# Patient Record
Sex: FEMALE | Race: BLACK OR AFRICAN AMERICAN | Employment: UNEMPLOYED | RURAL
[De-identification: names, ages, dates, MRNs, and addresses within clinical notes are randomized per-mention and may not be internally consistent; named-entity substitution may affect disease eponyms.]

---

## 2019-09-04 ENCOUNTER — OFFICE VISIT (OUTPATIENT)
Dept: INTERNAL MEDICINE CLINIC | Age: 59
End: 2019-09-04

## 2019-09-04 VITALS
RESPIRATION RATE: 16 BRPM | TEMPERATURE: 98 F | SYSTOLIC BLOOD PRESSURE: 122 MMHG | DIASTOLIC BLOOD PRESSURE: 77 MMHG | WEIGHT: 158 LBS | HEART RATE: 77 BPM | HEIGHT: 63 IN | BODY MASS INDEX: 28 KG/M2 | OXYGEN SATURATION: 97 %

## 2019-09-04 NOTE — PROGRESS NOTES
Chief Complaint   Patient presents with   [facility].     I have reviewed the patient's medical history in detail and updated the computerized patient record. Health Maintenance reviewed. Pt stated he/she does have an Advance Directive    Fall Risk Assessment, last 12 mths 9/4/2019   Able to walk? Yes   Fall in past 12 months? No       3 most recent PHQ Screens 9/4/2019   Little interest or pleasure in doing things Nearly every day   Feeling down, depressed, irritable, or hopeless Nearly every day   Total Score PHQ 2 6       Abuse Screening Questionnaire 9/4/2019   Do you ever feel afraid of your partner? N   Are you in a relationship with someone who physically or mentally threatens you? N   Is it safe for you to go home?  Y       ADL Assessment 9/4/2019   Feeding yourself No Help Needed   Getting from bed to chair No Help Needed   Getting dressed No Help Needed   Bathing or showering No Help Needed   Walk across the room (includes cane/walker) No Help Needed   Using the telphone No Help Needed   Taking your medications No Help Needed   Preparing meals No Help Needed   Managing money (expenses/bills) No Help Needed   Moderately strenuous housework (laundry) No Help Needed   Shopping for personal items (toiletries/medicines) No Help Needed   Shopping for groceries No Help Needed   Driving No Help Needed   Climbing a flight of stairs No Help Needed   Getting to places beyond walking distances No Help Needed       Pt was unable to wait and left with her ride    Dr. Brian Walter did not see this patient

## 2021-11-09 ENCOUNTER — OFFICE VISIT (OUTPATIENT)
Dept: FAMILY MEDICINE CLINIC | Age: 61
End: 2021-11-09
Payer: MEDICAID

## 2021-11-09 VITALS
OXYGEN SATURATION: 95 % | BODY MASS INDEX: 26.82 KG/M2 | HEIGHT: 63 IN | RESPIRATION RATE: 18 BRPM | SYSTOLIC BLOOD PRESSURE: 131 MMHG | HEART RATE: 86 BPM | TEMPERATURE: 97.9 F | DIASTOLIC BLOOD PRESSURE: 84 MMHG | WEIGHT: 151.4 LBS

## 2021-11-09 DIAGNOSIS — I25.10 CORONARY ARTERY DISEASE INVOLVING NATIVE CORONARY ARTERY OF NATIVE HEART WITHOUT ANGINA PECTORIS: ICD-10-CM

## 2021-11-09 DIAGNOSIS — I21.02 ST ELEVATION MYOCARDIAL INFARCTION INVOLVING LEFT ANTERIOR DESCENDING (LAD) CORONARY ARTERY (HCC): Primary | ICD-10-CM

## 2021-11-09 DIAGNOSIS — I50.20 HFREF (HEART FAILURE WITH REDUCED EJECTION FRACTION) (HCC): ICD-10-CM

## 2021-11-09 DIAGNOSIS — D64.9 ANEMIA, UNSPECIFIED TYPE: ICD-10-CM

## 2021-11-09 DIAGNOSIS — I23.6 LV (LEFT VENTRICULAR) MURAL THROMBUS FOLLOWING MI (HCC): ICD-10-CM

## 2021-11-09 DIAGNOSIS — I10 PRIMARY HYPERTENSION: ICD-10-CM

## 2021-11-09 LAB
ALBUMIN SERPL-MCNC: 3.3 G/DL (ref 3.5–5)
ALBUMIN/GLOB SERPL: 0.9 {RATIO} (ref 1.1–2.2)
ALP SERPL-CCNC: 76 U/L (ref 45–117)
ALT SERPL-CCNC: 24 U/L (ref 12–78)
ANION GAP SERPL CALC-SCNC: 8 MMOL/L (ref 5–15)
AST SERPL-CCNC: 18 U/L (ref 15–37)
BASOPHILS # BLD: 0 K/UL (ref 0–0.1)
BASOPHILS NFR BLD: 1 % (ref 0–1)
BILIRUB SERPL-MCNC: 0.3 MG/DL (ref 0.2–1)
BUN SERPL-MCNC: 8 MG/DL (ref 6–20)
BUN/CREAT SERPL: 10 (ref 12–20)
CALCIUM SERPL-MCNC: 9.4 MG/DL (ref 8.5–10.1)
CHLORIDE SERPL-SCNC: 108 MMOL/L (ref 97–108)
CO2 SERPL-SCNC: 21 MMOL/L (ref 21–32)
CREAT SERPL-MCNC: 0.83 MG/DL (ref 0.55–1.02)
DIFFERENTIAL METHOD BLD: ABNORMAL
EOSINOPHIL # BLD: 0 K/UL (ref 0–0.4)
EOSINOPHIL NFR BLD: 1 % (ref 0–7)
ERYTHROCYTE [DISTWIDTH] IN BLOOD BY AUTOMATED COUNT: 15.3 % (ref 11.5–14.5)
GLOBULIN SER CALC-MCNC: 3.8 G/DL (ref 2–4)
GLUCOSE SERPL-MCNC: 61 MG/DL (ref 65–100)
HCT VFR BLD AUTO: 40.3 % (ref 35–47)
HGB BLD-MCNC: 13.3 G/DL (ref 11.5–16)
IMM GRANULOCYTES # BLD AUTO: 0 K/UL (ref 0–0.04)
IMM GRANULOCYTES NFR BLD AUTO: 0 % (ref 0–0.5)
LYMPHOCYTES # BLD: 1.5 K/UL (ref 0.8–3.5)
LYMPHOCYTES NFR BLD: 37 % (ref 12–49)
MCH RBC QN AUTO: 31.6 PG (ref 26–34)
MCHC RBC AUTO-ENTMCNC: 33 G/DL (ref 30–36.5)
MCV RBC AUTO: 95.7 FL (ref 80–99)
MONOCYTES # BLD: 0.6 K/UL (ref 0–1)
MONOCYTES NFR BLD: 16 % (ref 5–13)
NEUTS SEG # BLD: 1.8 K/UL (ref 1.8–8)
NEUTS SEG NFR BLD: 45 % (ref 32–75)
NRBC # BLD: 0 K/UL (ref 0–0.01)
NRBC BLD-RTO: 0 PER 100 WBC
PLATELET # BLD AUTO: 230 K/UL (ref 150–400)
PMV BLD AUTO: 10.6 FL (ref 8.9–12.9)
POTASSIUM SERPL-SCNC: 4.7 MMOL/L (ref 3.5–5.1)
PROT SERPL-MCNC: 7.1 G/DL (ref 6.4–8.2)
RBC # BLD AUTO: 4.21 M/UL (ref 3.8–5.2)
SODIUM SERPL-SCNC: 137 MMOL/L (ref 136–145)
WBC # BLD AUTO: 4 K/UL (ref 3.6–11)

## 2021-11-09 PROCEDURE — 99204 OFFICE O/P NEW MOD 45 MIN: CPT | Performed by: FAMILY MEDICINE

## 2021-11-09 RX ORDER — NITROGLYCERIN 0.4 MG/1
TABLET SUBLINGUAL
COMMUNITY
Start: 2021-10-18

## 2021-11-09 RX ORDER — WARFARIN 10 MG/1
10 TABLET ORAL DAILY
Qty: 30 TABLET | Refills: 3 | Status: SHIPPED | OUTPATIENT
Start: 2021-11-09 | End: 2021-12-07 | Stop reason: SDUPTHER

## 2021-11-09 RX ORDER — PANTOPRAZOLE SODIUM 40 MG/1
40 TABLET, DELAYED RELEASE ORAL
Qty: 30 TABLET | Refills: 3 | Status: SHIPPED | OUTPATIENT
Start: 2021-11-09 | End: 2022-02-09 | Stop reason: SDUPTHER

## 2021-11-09 RX ORDER — CLOPIDOGREL BISULFATE 75 MG/1
75 TABLET ORAL DAILY
Qty: 30 TABLET | Refills: 3 | Status: SHIPPED | OUTPATIENT
Start: 2021-11-09 | End: 2022-02-09 | Stop reason: SDUPTHER

## 2021-11-09 RX ORDER — ATORVASTATIN CALCIUM 80 MG/1
80 TABLET, FILM COATED ORAL DAILY
Qty: 30 TABLET | Refills: 3 | Status: SHIPPED | OUTPATIENT
Start: 2021-11-09 | End: 2022-02-09 | Stop reason: SDUPTHER

## 2021-11-09 RX ORDER — SPIRONOLACTONE 25 MG/1
25 TABLET ORAL DAILY
Qty: 30 TABLET | Refills: 3 | Status: SHIPPED | OUTPATIENT
Start: 2021-11-09 | End: 2022-02-09 | Stop reason: SDUPTHER

## 2021-11-09 RX ORDER — SACUBITRIL AND VALSARTAN 49; 51 MG/1; MG/1
1 TABLET, FILM COATED ORAL 2 TIMES DAILY
Qty: 30 TABLET | Refills: 3 | Status: SHIPPED | OUTPATIENT
Start: 2021-11-09 | End: 2022-01-14

## 2021-11-09 RX ORDER — SPIRONOLACTONE 25 MG/1
25 TABLET ORAL DAILY
COMMUNITY
Start: 2021-10-16 | End: 2021-11-09 | Stop reason: SDUPTHER

## 2021-11-09 RX ORDER — ATORVASTATIN CALCIUM 80 MG/1
80 TABLET, FILM COATED ORAL DAILY
COMMUNITY
Start: 2021-10-16 | End: 2021-11-09 | Stop reason: SDUPTHER

## 2021-11-09 RX ORDER — CLOPIDOGREL BISULFATE 75 MG/1
75 TABLET ORAL DAILY
COMMUNITY
Start: 2021-10-16 | End: 2021-11-09 | Stop reason: SDUPTHER

## 2021-11-09 RX ORDER — PANTOPRAZOLE SODIUM 40 MG/1
40 TABLET, DELAYED RELEASE ORAL
COMMUNITY
Start: 2021-10-16 | End: 2021-11-09 | Stop reason: SDUPTHER

## 2021-11-09 RX ORDER — WARFARIN 10 MG/1
10 TABLET ORAL DAILY
COMMUNITY
Start: 2021-10-16 | End: 2021-11-09 | Stop reason: SDUPTHER

## 2021-11-09 RX ORDER — SACUBITRIL AND VALSARTAN 49; 51 MG/1; MG/1
1 TABLET, FILM COATED ORAL 2 TIMES DAILY
COMMUNITY
Start: 2021-10-19 | End: 2021-11-09 | Stop reason: SDUPTHER

## 2021-11-09 RX ORDER — LANOLIN ALCOHOL/MO/W.PET/CERES
3 CREAM (GRAM) TOPICAL
COMMUNITY
Start: 2021-10-16 | End: 2022-05-31 | Stop reason: SDUPTHER

## 2021-11-09 NOTE — PROGRESS NOTES
Health Maintenance Due   Topic Date Due    Hepatitis C Screening  Never done    Pneumococcal 0-64 years (1 of 2 - PPSV23) Never done    COVID-19 Vaccine (1) Never done    DTaP/Tdap/Td series (1 - Tdap) Never done    Cervical cancer screen  Never done    Lipid Screen  Never done    Colorectal Cancer Screening Combo  Never done    Shingrix Vaccine Age 50> (1 of 2) Never done    Breast Cancer Screen Mammogram  Never done    Flu Vaccine (1) Never done

## 2021-11-09 NOTE — PROGRESS NOTES
ALEJANDRA Luo is a 59 y.o. female who Presents to [facility] care. Evidently recently hospitalized at [address] [place] [address]. Pulled [facility] record prior to visit. Admitted 10/610/19 for STEMI and LV thrombus. Presented via EMS for intermittent chest pain worsening on exertion and becoming acutely more severe prompting her to call 911. EKG showed an anterior STEMI. Cath ZEN to mid LAD which improved her chest pain. 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 Echo showed 3 x 2 cm thrombus. Needs warfarin for this. Was given heparin bridge to warfarin    Will need repeat TTE in 3 months to assess LV thrombus    Acute systolic heart failure LVEF 30-35%. Also recommended repeat TTE in 3 months to consider primary prevention ICD. She had bradycardia so no beta-blocker was started    Some incidental abnormal labs. Hyponatremia thought to be due to dehydration.  on admission. Anemia hemoglobin 11.7 normal iron levels. Consider anemia of chronic disease. Mention 34 beers per day. No issues of withdrawal and patient. Recently suffered the loss of her . PMHx:  Past Medical History:   Diagnosis Date    Hypertension     Psychiatric disorder     panic, anxiety       Meds:   Current Outpatient Medications   Medication Sig Dispense Refill    Entresto 49-51 mg tab tablet Take 1 Tablet by mouth two (2) times a day.  warfarin (COUMADIN) 10 mg tablet Take 10 mg by mouth daily.  clopidogreL (PLAVIX) 75 mg tab Take 75 mg by mouth daily.  atorvastatin (LIPITOR) 80 mg tablet Take 80 mg by mouth daily.  pantoprazole (PROTONIX) 40 mg tablet Take 40 mg by mouth Daily (before breakfast).  spironolactone (ALDACTONE) 25 mg tablet Take 25 mg by mouth daily.  nitroglycerin (NITROSTAT) 0.4 mg SL tablet DISSOLVE 1 TABLET UNDER THE TONGUE AS NEEDED FOR CHEST PAIN, MAY REPEAT EVERY 5 MINUTES FOR 3 TOTAL DOSES. IF PAIN IS NOT RELIEVED AFTER FIRS      melatonin 3 mg tablet Take 3 mg by mouth nightly as needed.  potassium chloride SR (KLOR-CON 10) 10 mEq tablet Take 1 Tab by mouth daily. (Patient not taking: Reported on 11/9/2021) 30 Tab 0    furosemide (LASIX) 20 mg tablet Take 1 Tab by mouth daily. (Patient not taking: Reported on 11/9/2021) 15 Tab 0    diazepam (VALIUM) 5 mg tablet Take 1 Tab by mouth every twelve (12) hours as needed for Anxiety. (Patient not taking: Reported on 11/9/2021) 30 Tab 0       Allergies: Allergies   Allergen Reactions    Iodine Other (comments)       Smoker:  Social History     Tobacco Use   Smoking Status Current Every Day Smoker    Packs/day: 0.50    Types: Cigarettes   Smokeless Tobacco Never Used       ETOH:   Social History     Substance and Sexual Activity   Alcohol Use No       FH:   Family History   Problem Relation Age of Onset    Heart Disease Mother     Cancer Father        ROS:   As listed in HPI. In addition:  Constitutional:   No headache, fever, fatigue, weight loss or weight gain      Cardiac:    No chest pain      Resp:   No cough or shortness of breath      Neuro   No loss of consciousness, dizziness, seizures      Physical Exam:  Blood pressure 131/84, pulse 86, temperature 97.9 °F (36.6 °C), temperature source Temporal, resp. rate 18, height 5' 3\" (1.6 m), weight 151 lb 6.4 oz (68.7 kg), SpO2 95 %. GEN: No apparent distress. Alert and oriented and responds to all questions appropriately. NEUROLOGIC:  No focal neurologic deficits. Strength and sensation grossly intact. Coordination and gait grossly intact. EXT: Well perfused. No edema. SKIN: No obvious rashes. 4 cm inclusion cyst left neck. Not painful the patient.   Reports this has been present since she was a teenager  Lungs clear to auscultation bilaterally  CV regular rhythm no murmur         Assessment and Plan     STEMI  Plavix  Atorvastatin 80  Entresto 4951  No beta-blocker due to bradycardia in the hospital.  No longer bradycardic but will defer this to cardiology    LV thrombus  On Coumadin. Currently getting Coumadin checks through her home health. Reports that home health is notifying her cardiologist for Coumadin dosing  She does not know what she is taking but reports that her INR was 2.0    Plan will be to check TTE in 3 months and decide whether she needs to continue Coumadin    At some point home health will stop coming and she will need to go to a Coumadin clinic. I think should find a most convenient to go to [address] [place] [address] cardiology if they do a Coumadin clinic. If not she is welcome to come here but I emphasized to her the importance of going somewhere for Coumadin checks    We will check CBC and CMP which were abnormal when she went to the hospital but pretty much normalized by discharge    Hospital discharge  10.9 hemoglobin, felt to be anemia of chronic disease  GFR 86  Sodium 138    This was a 45-minute visit. Greater than 50% of the time was spent counseling the patient on recent heart attack. She had many questions. ICD-10-CM ICD-9-CM    1. ST elevation myocardial infarction involving left anterior descending (LAD) coronary artery ([facility])  I21.02 410.10    2. Anemia, unspecified type  D64.9 285.9    3. Primary hypertension  I10 401.9    4. HFrEF (heart failure with reduced ejection fraction) ([facility])  I50.20 428.20 CBC WITH AUTOMATED DIFF      METABOLIC PANEL, COMPREHENSIVE   5. Coronary artery disease involving native coronary artery of native heart without angina pectoris  I25.10 414.01    6. LV (left ventricular) mural thrombus following MI ([facility])  I23.6 429.79      410.82        AVS given.  Pt expressed understanding of instructions no

## 2021-12-07 NOTE — TELEPHONE ENCOUNTER
ALANA verified. Pt believes that her warfarin pill is not the right pill. She describes it as a round, white pill. When she split the pill with her knife, the medicine has \"messed her up from head to feet\". She cannot explain it; so she wants Dr. Denise Frazier to call her.

## 2021-12-08 RX ORDER — WARFARIN 10 MG/1
10 TABLET ORAL DAILY
Qty: 30 TABLET | Refills: 3 | Status: SHIPPED | OUTPATIENT
Start: 2021-12-08 | End: 2022-02-09 | Stop reason: SDUPTHER

## 2021-12-08 NOTE — TELEPHONE ENCOUNTER
Returned call. Really does not like the round pill. Explained that it is unlikely she was having these side effects from the medicine but she feels that bad vibes are coming from this particular pill. She is not willing to take the Coumadin that she has. Called in a new prescription to [facility] see if there is something different    She is not sure how much she is taking, its been written down by her home health nurse. She had her last visit with home health last week and reports that her INR was 1.6. Plans to come here to follow-up on Coumadin but has not made an appointment yet. I encouraged her to make an appointment for Coumadin check next week.   Emphasize she needs to bring her dosing schedule

## 2021-12-17 ENCOUNTER — OFFICE VISIT (OUTPATIENT)
Dept: FAMILY MEDICINE CLINIC | Age: 61
End: 2021-12-17
Payer: MEDICAID

## 2021-12-17 VITALS
TEMPERATURE: 98.5 F | BODY MASS INDEX: 27.21 KG/M2 | HEIGHT: 63 IN | RESPIRATION RATE: 16 BRPM | SYSTOLIC BLOOD PRESSURE: 113 MMHG | DIASTOLIC BLOOD PRESSURE: 77 MMHG | OXYGEN SATURATION: 96 % | HEART RATE: 81 BPM | WEIGHT: 153.6 LBS

## 2021-12-17 DIAGNOSIS — I10 PRIMARY HYPERTENSION: ICD-10-CM

## 2021-12-17 DIAGNOSIS — D64.9 ANEMIA, UNSPECIFIED TYPE: ICD-10-CM

## 2021-12-17 DIAGNOSIS — I23.6 LV (LEFT VENTRICULAR) MURAL THROMBUS FOLLOWING MI (HCC): Primary | ICD-10-CM

## 2021-12-17 LAB
INR BLD: 1
PT POC: 12.3 SECONDS
VALID INTERNAL CONTROL?: YES

## 2021-12-17 PROCEDURE — 99214 OFFICE O/P EST MOD 30 MIN: CPT | Performed by: FAMILY MEDICINE

## 2021-12-17 PROCEDURE — 85610 PROTHROMBIN TIME: CPT | Performed by: FAMILY MEDICINE

## 2021-12-17 NOTE — PROGRESS NOTES
1. Have you been to the ER, urgent care clinic since your last visit? Hospitalized since your last visit? No    2. Have you seen or consulted any other health care providers outside of the [address] [place] [address] since your last visit? Include any pap smears or colon screening.  No    Health Maintenance Due   Topic Date Due    Hepatitis C Screening  Never done    COVID-19 Vaccine (1) Never done    Pneumococcal 0-64 years (1 of 2 - PPSV23) Never done    DTaP/Tdap/Td series (1 - Tdap) Never done    Cervical cancer screen  Never done    Colorectal Cancer Screening Combo  Never done    Shingrix Vaccine Age 50> (1 of 2) Never done    Breast Cancer Screen Mammogram  Never done    Flu Vaccine (1) Never done     Chief Complaint   Patient presents with    Coagulation disorder     f/u   [facility]

## 2021-12-17 NOTE — PROGRESS NOTES
HPI  Fay Greco is a 59 y.o. female who presents to follow-up on LV thrombus. Establish care November 2021. At that point recently hospitalized at [facility]. Is just negative is not  Admitted 10/610/19 for STEMI and LV thrombus. Presented via EMS for intermittent chest pain worsening on exertion and becoming acutely more severe prompting her to call 911. EKG showed an anterior STEMI. Cath ZEN to mid LAD which improved her chest pain. 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 Echo showed 3 x 2 cm thrombus. Needs warfarin for this. Was given heparin bridge to warfarin    Will need repeat TTE in 3 months to assess LV thrombus    Acute systolic heart failure LVEF 30-35%. Also recommended repeat TTE in 3 months to consider primary prevention ICD. She had bradycardia so no beta-blocker was started    Some incidental abnormal labs. Hyponatremia thought to be due to dehydration.  on admission. Anemia hemoglobin 11.7 normal iron levels. Consider anemia of chronic disease. Mention 34 beers per day. No issues of withdrawal and patient. Recently suffered the loss of her . PMHx:  Past Medical History:   Diagnosis Date    Hypertension     Psychiatric disorder     panic, anxiety       Meds:   Current Outpatient Medications   Medication Sig Dispense Refill    warfarin (COUMADIN) 10 mg tablet Take 1 Tablet by mouth daily. 30 Tablet 3    melatonin 3 mg tablet Take 3 mg by mouth nightly as needed.  sacubitriL-valsartan (Entresto) 49-51 mg tab tablet Take 1 Tablet by mouth two (2) times a day. 30 Tablet 3    clopidogreL (PLAVIX) 75 mg tab Take 1 Tablet by mouth daily. 30 Tablet 3    atorvastatin (LIPITOR) 80 mg tablet Take 1 Tablet by mouth daily. 30 Tablet 3    pantoprazole (PROTONIX) 40 mg tablet Take 1 Tablet by mouth Daily (before breakfast). 30 Tablet 3    spironolactone (ALDACTONE) 25 mg tablet Take 1 Tablet by mouth daily.  27 Tablet 3    nitroglycerin (NITROSTAT) 0.4 mg SL tablet DISSOLVE 1 TABLET UNDER THE TONGUE AS NEEDED FOR CHEST PAIN, MAY REPEAT EVERY 5 MINUTES FOR 3 TOTAL DOSES. IF PAIN IS NOT RELIEVED AFTER FIRS (Patient not taking: Reported on 12/17/2021)         Allergies: Allergies   Allergen Reactions    Iodine Other (comments)       Smoker:  Social History     Tobacco Use   Smoking Status Current Every Day Smoker    Packs/day: 0.50    Types: Cigarettes   Smokeless Tobacco Never Used       ETOH:   Social History     Substance and Sexual Activity   Alcohol Use No       FH:   Family History   Problem Relation Age of Onset    Heart Disease Mother     Cancer Father        ROS:   As listed in HPI. In addition:  Constitutional:   No headache, fever, fatigue, weight loss or weight gain      Cardiac:    No chest pain      Resp:   No cough or shortness of breath      Neuro   No loss of consciousness, dizziness, seizures      Physical Exam:  Blood pressure 113/77, pulse 81, temperature 98.5 °F (36.9 °C), temperature source Oral, resp. rate 16, height 5' 3\" (1.6 m), weight 153 lb 9.6 oz (69.7 kg), SpO2 96 %. GEN: No apparent distress. Alert and oriented and responds to all questions appropriately. NEUROLOGIC:  No focal neurologic deficits. Strength and sensation grossly intact. Coordination and gait grossly intact. EXT: Well perfused. No edema. SKIN: No obvious rashes. 4 cm inclusion cyst left neck. Not painful the patient. Reports this has been present since she was a teenager  Lungs clear to auscultation bilaterally  CV regular rhythm no murmur         Assessment and Plan     STEMI  Plavix  Atorvastatin 80  Entresto 4951  No beta-blocker due to bradycardia in the hospital.  No longer bradycardic but will defer this to cardiology    LV thrombus  On Coumadin. Had been getting Coumadin checks through home health.   No longer receiving home health  She will transition to getting Coumadin checks with us  She has not currently taking Coumadin because she was suspicious that the pill she received from pharmacy. No Coumadin for a week. INR today 1.0.. Has a medication that she is comfortable with now    She thinks she is taking 10 mg 3 times a week and 5 mg the rest of the days. She is not sure about this and has her dosing instructions written down at home    Return in 1 week for Coumadin check  Bring your dosing instructions we know what you are taking      Plan will be to check TTE in 3 months and decide whether she needs to continue Coumadin        ICD-10-CM ICD-9-CM    1. LV (left ventricular) mural thrombus following MI ([facility])  I23.6 429.79 AMB POC PT/INR     410.82        AVS given.  Pt expressed understanding of instructions

## 2021-12-29 ENCOUNTER — CLINICAL SUPPORT (OUTPATIENT)
Dept: FAMILY MEDICINE CLINIC | Age: 61
End: 2021-12-29
Payer: MEDICAID

## 2021-12-29 DIAGNOSIS — I23.6 LV (LEFT VENTRICULAR) MURAL THROMBUS FOLLOWING MI (HCC): Primary | ICD-10-CM

## 2021-12-29 LAB
INR BLD: 1.7
PT POC: 20.3 SECONDS
VALID INTERNAL CONTROL?: YES

## 2021-12-29 PROCEDURE — 85610 PROTHROMBIN TIME: CPT | Performed by: FAMILY MEDICINE

## 2022-01-12 ENCOUNTER — CLINICAL SUPPORT (OUTPATIENT)
Dept: FAMILY MEDICINE CLINIC | Age: 62
End: 2022-01-12
Payer: MEDICAID

## 2022-01-12 DIAGNOSIS — I23.6 LV (LEFT VENTRICULAR) MURAL THROMBUS FOLLOWING MI (HCC): Primary | ICD-10-CM

## 2022-01-12 LAB
INR BLD: 1.9
PT POC: 22.3 SECONDS
VALID INTERNAL CONTROL?: YES

## 2022-01-12 PROCEDURE — 85610 PROTHROMBIN TIME: CPT | Performed by: FAMILY MEDICINE

## 2022-01-12 NOTE — PROGRESS NOTES
Aidan Tian is a 59 y.o. female who presents today for Anticoagulation monitoring. Indication: Thrombosis  INR Goal: 2.0-3.0. Current dose:  Coumadin 10 mg daily, except 5 mg Tues/Thurs. Missed Coumadin Doses:  None  Medication Changes:  no  Dietary Changes:  no    Symptoms: taking coumadin appropriately without any bleeding. Latest INRs:  Lab Results   Component Value Date/Time    INR POC 1.7 12/29/2021 02:31 PM    INR POC 1.0 12/17/2021 01:45 PM        New Coumadin dose:.current treatment plan is effective, no change in therapy, the following changes are made - see anti-coag calendar. Next check to be scheduled for  2 weeks.

## 2022-01-26 ENCOUNTER — CLINICAL SUPPORT (OUTPATIENT)
Dept: FAMILY MEDICINE CLINIC | Age: 62
End: 2022-01-26
Payer: MEDICAID

## 2022-01-26 DIAGNOSIS — I23.6 LV (LEFT VENTRICULAR) MURAL THROMBUS FOLLOWING MI (HCC): Primary | ICD-10-CM

## 2022-01-26 LAB
INR BLD: 2.1
PT POC: 24.9 SECONDS
VALID INTERNAL CONTROL?: YES

## 2022-01-26 PROCEDURE — 85610 PROTHROMBIN TIME: CPT | Performed by: FAMILY MEDICINE

## 2022-01-26 NOTE — PROGRESS NOTES
Arabella Zuluaga is a 57 y.o. female who presents today for Anticoagulation monitoring. Indication: Thrombus  INR Goal: 2.0-3.0. Current dose:  Coumadin 10 mg daily, except 5 mg Thurs. Missed Coumadin Doses:  None  Medication Changes:  no  Dietary Changes:  no    Symptoms: taking coumadin appropriately without any bleeding. Latest INRs:  Lab Results   Component Value Date/Time    INR POC 2.1 01/26/2022 02:03 PM    INR POC 1.9 01/12/2022 02:12 PM    INR POC 1.7 12/29/2021 02:31 PM        New Coumadin dose:.current treatment plan is effective, no change in therapy. Next check to be scheduled for  2 weeks.

## 2022-02-09 ENCOUNTER — CLINICAL SUPPORT (OUTPATIENT)
Dept: FAMILY MEDICINE CLINIC | Age: 62
End: 2022-02-09
Payer: MEDICAID

## 2022-02-09 DIAGNOSIS — I23.6 LV (LEFT VENTRICULAR) MURAL THROMBUS FOLLOWING MI (HCC): Primary | ICD-10-CM

## 2022-02-09 LAB
INR BLD: 1.7
PT POC: 20.9 SECONDS
VALID INTERNAL CONTROL?: YES

## 2022-02-09 PROCEDURE — 85610 PROTHROMBIN TIME: CPT | Performed by: FAMILY MEDICINE

## 2022-02-09 RX ORDER — SACUBITRIL AND VALSARTAN 49; 51 MG/1; MG/1
1 TABLET, FILM COATED ORAL 2 TIMES DAILY
Qty: 60 TABLET | Refills: 3 | Status: SHIPPED | OUTPATIENT
Start: 2022-02-09 | End: 2022-05-31 | Stop reason: SDUPTHER

## 2022-02-09 RX ORDER — ATORVASTATIN CALCIUM 80 MG/1
80 TABLET, FILM COATED ORAL DAILY
Qty: 30 TABLET | Refills: 3 | Status: SHIPPED | OUTPATIENT
Start: 2022-02-09 | End: 2022-05-31 | Stop reason: SDUPTHER

## 2022-02-09 RX ORDER — PANTOPRAZOLE SODIUM 40 MG/1
40 TABLET, DELAYED RELEASE ORAL
Qty: 30 TABLET | Refills: 3 | Status: SHIPPED | OUTPATIENT
Start: 2022-02-09 | End: 2022-05-31 | Stop reason: SDUPTHER

## 2022-02-09 RX ORDER — CLOPIDOGREL BISULFATE 75 MG/1
75 TABLET ORAL DAILY
Qty: 30 TABLET | Refills: 3 | Status: SHIPPED | OUTPATIENT
Start: 2022-02-09 | End: 2022-05-31 | Stop reason: SDUPTHER

## 2022-02-09 RX ORDER — SPIRONOLACTONE 25 MG/1
25 TABLET ORAL DAILY
Qty: 30 TABLET | Refills: 3 | Status: SHIPPED | OUTPATIENT
Start: 2022-02-09 | End: 2022-05-31 | Stop reason: SDUPTHER

## 2022-02-09 RX ORDER — WARFARIN 10 MG/1
10 TABLET ORAL DAILY
Qty: 30 TABLET | Refills: 3 | Status: SHIPPED | OUTPATIENT
Start: 2022-02-09 | End: 2022-05-31 | Stop reason: SDUPTHER

## 2022-02-09 NOTE — TELEPHONE ENCOUNTER
Requested Prescriptions     Pending Prescriptions Disp Refills    warfarin (COUMADIN) 10 mg tablet 30 Tablet 3     Sig: Take 1 Tablet by mouth daily.

## 2022-02-09 NOTE — TELEPHONE ENCOUNTER
Requested Prescriptions     Pending Prescriptions Disp Refills    atorvastatin (LIPITOR) 80 mg tablet 30 Tablet 3     Sig: Take 1 Tablet by mouth daily.  clopidogreL (PLAVIX) 75 mg tab 30 Tablet 3     Sig: Take 1 Tablet by mouth daily.  sacubitriL-valsartan (Entresto) 49-51 mg tab tablet 90 Tablet 3     Sig: Take 1 Tablet by mouth two (2) times a day.  pantoprazole (PROTONIX) 40 mg tablet 30 Tablet 3     Sig: Take 1 Tablet by mouth Daily (before breakfast).  spironolactone (ALDACTONE) 25 mg tablet 30 Tablet 3     Sig: Take 1 Tablet by mouth daily.

## 2022-02-09 NOTE — PROGRESS NOTES
Annia Romero is a 57 y.o. female who presents today for Anticoagulation monitoring. Indication: LV Mural Thrombus  INR Goal: 2.0-3.0. Current dose:  Coumadin 10 mg daily, except 5 mg Thurs. Missed Coumadin Doses:  None  Medication Changes:  no  Dietary Changes:  no    Symptoms: taking coumadin appropriately with a small amount of bleeding. Latest INRs:  Lab Results   Component Value Date/Time    INR POC 2.1 01/26/2022 02:03 PM    INR POC 1.9 01/12/2022 02:12 PM    INR POC 1.7 12/29/2021 02:31 PM        New Coumadin dose:.current treatment plan is effective, no change in therapy. Next check to be scheduled for  2 weeks.

## 2022-02-23 ENCOUNTER — CLINICAL SUPPORT (OUTPATIENT)
Dept: FAMILY MEDICINE CLINIC | Age: 62
End: 2022-02-23
Payer: MEDICAID

## 2022-02-23 DIAGNOSIS — I23.6 LV (LEFT VENTRICULAR) MURAL THROMBUS FOLLOWING MI (HCC): Primary | ICD-10-CM

## 2022-02-23 LAB
INR BLD: 2.8
PT POC: 33.4 SECONDS
VALID INTERNAL CONTROL?: YES

## 2022-02-23 PROCEDURE — 85610 PROTHROMBIN TIME: CPT | Performed by: FAMILY MEDICINE

## 2022-02-23 NOTE — PROGRESS NOTES
Scarlett Graves is a 57 y.o. female who presents today for Anticoagulation monitoring. Indication: Thrombosus  INR Goal: 2.0-3.0. Current dose:  Coumadin 10 mg daily, except 5 mg Thurs. Missed Coumadin Doses:  None  Medication Changes:  no  Dietary Changes:  no    Symptoms: taking coumadin appropriately without any bleeding. Latest INRs:  Lab Results   Component Value Date/Time    INR POC 2.8 02/23/2022 02:03 PM    INR POC 1.7 02/09/2022 02:01 PM    INR POC 2.1 01/26/2022 02:03 PM        New Coumadin dose:.current treatment plan is effective, no change in therapy. Next check to be scheduled for  2 weeks.

## 2022-03-09 ENCOUNTER — CLINICAL SUPPORT (OUTPATIENT)
Dept: FAMILY MEDICINE CLINIC | Age: 62
End: 2022-03-09
Payer: MEDICAID

## 2022-03-09 DIAGNOSIS — I23.6 LV (LEFT VENTRICULAR) MURAL THROMBUS FOLLOWING MI (HCC): Primary | ICD-10-CM

## 2022-03-09 LAB
INR BLD: 2.2
PT POC: 27 SECONDS
VALID INTERNAL CONTROL?: YES

## 2022-03-09 PROCEDURE — 85610 PROTHROMBIN TIME: CPT | Performed by: FAMILY MEDICINE

## 2022-03-09 NOTE — PROGRESS NOTES
Nimco Fields is a 57 y.o. female who presents today for Anticoagulation monitoring. Indication: Thrombus  INR Goal: 2.0-3.0. Current dose:  Coumadin 10 mg daily, except 5 mg Thurs. Missed Coumadin Doses:  None  Medication Changes:  no  Dietary Changes:  no    Symptoms: taking coumadin appropriately without any bleeding. Latest INRs:  Lab Results   Component Value Date/Time    INR POC 2.8 02/23/2022 02:03 PM    INR POC 1.7 02/09/2022 02:01 PM    INR POC 2.1 01/26/2022 02:03 PM        New Coumadin dose:.current treatment plan is effective, no change in therapy. Next check to be scheduled for  2 weeks.

## 2022-04-13 ENCOUNTER — CLINICAL SUPPORT (OUTPATIENT)
Dept: FAMILY MEDICINE CLINIC | Age: 62
End: 2022-04-13
Payer: MEDICAID

## 2022-04-13 DIAGNOSIS — I23.6 LV (LEFT VENTRICULAR) MURAL THROMBUS FOLLOWING MI (HCC): Primary | ICD-10-CM

## 2022-04-13 LAB
INR BLD: 1.7
PT POC: 20.4 SECONDS
VALID INTERNAL CONTROL?: YES

## 2022-04-13 PROCEDURE — 85610 PROTHROMBIN TIME: CPT | Performed by: FAMILY MEDICINE

## 2022-04-13 NOTE — PROGRESS NOTES
Steff Jackson is a 57 y.o. female who presents today for Anticoagulation monitoring. Indication: Thrombus  INR Goal: 2.0-3.0. Current dose:  Coumadin 10 mg daily, except 5 mg Thurs. Missed Coumadin Doses:  None  Medication Changes:  no  Dietary Changes:  no    Symptoms: taking coumadin appropriately without any bleeding. Latest INRs:  Lab Results   Component Value Date/Time    INR POC 1.7 04/13/2022 01:58 PM    INR POC 2.2 03/09/2022 02:15 PM    INR POC 2.8 02/23/2022 02:03 PM        New Coumadin dose:.current treatment plan is effective, no change in therapy. Next check to be scheduled for  2 weeks.

## 2022-04-27 ENCOUNTER — CLINICAL SUPPORT (OUTPATIENT)
Dept: FAMILY MEDICINE CLINIC | Age: 62
End: 2022-04-27
Payer: MEDICAID

## 2022-04-27 DIAGNOSIS — I23.6 LV (LEFT VENTRICULAR) MURAL THROMBUS FOLLOWING MI (HCC): Primary | ICD-10-CM

## 2022-04-27 LAB
INR BLD: 2.2
PT POC: 26.3 SECONDS
VALID INTERNAL CONTROL?: YES

## 2022-04-27 PROCEDURE — 85610 PROTHROMBIN TIME: CPT | Performed by: FAMILY MEDICINE

## 2022-04-27 NOTE — PROGRESS NOTES
Victoriano Teixeira is a 57 y.o. female who presents today for Anticoagulation monitoring. Indication: Thrombus  INR Goal: 2.0-3.0. Current dose:  Coumadin 10 mg daily, except 5 mg Thur. Missed Coumadin Doses:  None  Medication Changes:  no  Dietary Changes:  no    Symptoms: taking coumadin appropriately without any bleeding. Latest INRs:  Lab Results   Component Value Date/Time    INR POC 2.2 04/27/2022 02:07 PM    INR POC 1.7 04/13/2022 01:58 PM    INR POC 2.2 03/09/2022 02:15 PM        New Coumadin dose:.current treatment plan is effective, no change in therapy. Next check to be scheduled for  2 weeks.

## 2022-05-16 ENCOUNTER — CLINICAL SUPPORT (OUTPATIENT)
Dept: FAMILY MEDICINE CLINIC | Age: 62
End: 2022-05-16
Payer: MEDICAID

## 2022-05-16 DIAGNOSIS — I23.6 LV (LEFT VENTRICULAR) MURAL THROMBUS FOLLOWING MI (HCC): Primary | ICD-10-CM

## 2022-05-16 LAB
INR BLD: 2
PT POC: 24.2 SECONDS
VALID INTERNAL CONTROL?: YES

## 2022-05-16 PROCEDURE — 85610 PROTHROMBIN TIME: CPT | Performed by: FAMILY MEDICINE

## 2022-05-16 NOTE — PROGRESS NOTES
Megan Evans is a 57 y.o. female who presents today for Anticoagulation monitoring. Indication: Thrombus  INR Goal: 2.0-3.0. Current dose:  Coumadin 10 mg daily, except 5 mg Thursday. Missed Coumadin Doses:  None  Medication Changes:  no  Dietary Changes:  no    Symptoms: taking coumadin appropriately without any bleeding. Latest INRs:  Lab Results   Component Value Date/Time    INR POC 2.0 05/16/2022 02:30 PM    INR POC 2.2 04/27/2022 02:07 PM    INR POC 1.7 04/13/2022 01:58 PM        New Coumadin dose:.current treatment plan is effective, no change in therapy. Next check to be scheduled for  2 weeks.

## 2022-05-31 ENCOUNTER — CLINICAL SUPPORT (OUTPATIENT)
Dept: FAMILY MEDICINE CLINIC | Age: 62
End: 2022-05-31
Payer: MEDICAID

## 2022-05-31 DIAGNOSIS — I23.6 LV (LEFT VENTRICULAR) MURAL THROMBUS FOLLOWING MI (HCC): Primary | ICD-10-CM

## 2022-05-31 LAB
INR BLD: 1.8
PT POC: 21.5 SECONDS
VALID INTERNAL CONTROL?: YES

## 2022-05-31 PROCEDURE — 85610 PROTHROMBIN TIME: CPT | Performed by: FAMILY MEDICINE

## 2022-05-31 RX ORDER — CLOPIDOGREL BISULFATE 75 MG/1
75 TABLET ORAL DAILY
Qty: 30 TABLET | Refills: 3 | Status: SHIPPED | OUTPATIENT
Start: 2022-05-31 | End: 2022-10-11 | Stop reason: SDUPTHER

## 2022-05-31 RX ORDER — LANOLIN ALCOHOL/MO/W.PET/CERES
3 CREAM (GRAM) TOPICAL
Qty: 90 TABLET | Refills: 3 | Status: SHIPPED | OUTPATIENT
Start: 2022-05-31

## 2022-05-31 RX ORDER — ATORVASTATIN CALCIUM 80 MG/1
80 TABLET, FILM COATED ORAL DAILY
Qty: 30 TABLET | Refills: 3 | Status: SHIPPED | OUTPATIENT
Start: 2022-05-31 | End: 2022-10-11 | Stop reason: SDUPTHER

## 2022-05-31 RX ORDER — WARFARIN 10 MG/1
10 TABLET ORAL DAILY
Qty: 30 TABLET | Refills: 3 | Status: SHIPPED | OUTPATIENT
Start: 2022-05-31 | End: 2022-07-12

## 2022-05-31 RX ORDER — PANTOPRAZOLE SODIUM 40 MG/1
40 TABLET, DELAYED RELEASE ORAL
Qty: 30 TABLET | Refills: 3 | Status: SHIPPED | OUTPATIENT
Start: 2022-05-31 | End: 2022-10-11 | Stop reason: SDUPTHER

## 2022-05-31 RX ORDER — SACUBITRIL AND VALSARTAN 49; 51 MG/1; MG/1
1 TABLET, FILM COATED ORAL 2 TIMES DAILY
Qty: 60 TABLET | Refills: 3 | Status: SHIPPED | OUTPATIENT
Start: 2022-05-31 | End: 2022-10-11 | Stop reason: SDUPTHER

## 2022-05-31 RX ORDER — SPIRONOLACTONE 25 MG/1
25 TABLET ORAL DAILY
Qty: 30 TABLET | Refills: 3 | Status: SHIPPED | OUTPATIENT
Start: 2022-05-31 | End: 2022-10-11 | Stop reason: SDUPTHER

## 2022-05-31 NOTE — PROGRESS NOTES
Gabriel Collet is a 57 y.o. female who presents today for Anticoagulation monitoring. Indication: Left Ventricular Thrombus  INR Goal: 2.0-3.0. Current dose:  Coumadin 10 mg daily, except 5 mg Thursday. Missed Coumadin Doses:  None  Medication Changes:  no  Dietary Changes:  no    Symptoms: taking coumadin appropriately without any bleeding. Latest INRs:  Lab Results   Component Value Date/Time    INR POC 2.0 05/16/2022 02:30 PM    INR POC 2.2 04/27/2022 02:07 PM    INR POC 1.7 04/13/2022 01:58 PM        New Coumadin dose:.current treatment plan is effective, no change in therapy. Next check to be scheduled for  2 weeks.

## 2022-06-14 ENCOUNTER — CLINICAL SUPPORT (OUTPATIENT)
Dept: FAMILY MEDICINE CLINIC | Age: 62
End: 2022-06-14

## 2022-06-14 DIAGNOSIS — I23.6 LV (LEFT VENTRICULAR) MURAL THROMBUS FOLLOWING MI (HCC): Primary | ICD-10-CM

## 2022-06-28 ENCOUNTER — CLINICAL SUPPORT (OUTPATIENT)
Dept: FAMILY MEDICINE CLINIC | Age: 62
End: 2022-06-28
Payer: MEDICAID

## 2022-06-28 DIAGNOSIS — I23.6 LV (LEFT VENTRICULAR) MURAL THROMBUS FOLLOWING MI (HCC): Primary | ICD-10-CM

## 2022-06-28 LAB
INR BLD: 2.2
PT POC: 26 SECONDS
VALID INTERNAL CONTROL?: YES

## 2022-06-28 PROCEDURE — 85610 PROTHROMBIN TIME: CPT | Performed by: FAMILY MEDICINE

## 2022-06-28 NOTE — PROGRESS NOTES
Diomedes Clifton is a 57 y.o. female who presents today for Anticoagulation monitoring. Indication: LV Thrombus  INR Goal: 2.0-3.0. Current dose:  Coumadin 10 mg daily, except 5 mg Thurs. Missed Coumadin Doses:  None  Medication Changes:  no  Dietary Changes:  no    Symptoms: taking coumadin appropriately without any bleeding. Latest INRs:  Lab Results   Component Value Date/Time    INR POC 2.2 06/28/2022 03:17 PM    INR POC 1.8 05/31/2022 02:40 PM    INR POC 2.0 05/16/2022 02:30 PM        New Coumadin dose:.current treatment plan is effective, no change in therapy. Next check to be scheduled for  2 weeks.

## 2022-07-12 RX ORDER — WARFARIN 10 MG/1
10 TABLET ORAL DAILY
Qty: 30 TABLET | Refills: 3 | Status: SHIPPED | OUTPATIENT
Start: 2022-07-12 | End: 2022-10-11 | Stop reason: SDUPTHER

## 2022-07-15 ENCOUNTER — OFFICE VISIT (OUTPATIENT)
Dept: FAMILY MEDICINE CLINIC | Age: 62
End: 2022-07-15
Payer: MEDICAID

## 2022-07-15 VITALS
OXYGEN SATURATION: 98 % | SYSTOLIC BLOOD PRESSURE: 136 MMHG | TEMPERATURE: 97.4 F | BODY MASS INDEX: 27.18 KG/M2 | WEIGHT: 153.4 LBS | DIASTOLIC BLOOD PRESSURE: 83 MMHG | HEART RATE: 66 BPM | RESPIRATION RATE: 16 BRPM | HEIGHT: 63 IN

## 2022-07-15 DIAGNOSIS — I25.10 CORONARY ARTERY DISEASE INVOLVING NATIVE CORONARY ARTERY OF NATIVE HEART WITHOUT ANGINA PECTORIS: ICD-10-CM

## 2022-07-15 DIAGNOSIS — I23.6 LV (LEFT VENTRICULAR) MURAL THROMBUS FOLLOWING MI (HCC): Primary | ICD-10-CM

## 2022-07-15 DIAGNOSIS — I10 HYPERTENSION, UNSPECIFIED TYPE: ICD-10-CM

## 2022-07-15 DIAGNOSIS — I10 PRIMARY HYPERTENSION: ICD-10-CM

## 2022-07-15 LAB
ALBUMIN SERPL-MCNC: 3.5 G/DL (ref 3.5–5)
ALBUMIN/GLOB SERPL: 1 {RATIO} (ref 1.1–2.2)
ALP SERPL-CCNC: 62 U/L (ref 45–117)
ALT SERPL-CCNC: 37 U/L (ref 12–78)
ANION GAP SERPL CALC-SCNC: 7 MMOL/L (ref 5–15)
AST SERPL-CCNC: 25 U/L (ref 15–37)
BASOPHILS # BLD: 0 K/UL (ref 0–0.1)
BASOPHILS NFR BLD: 1 % (ref 0–1)
BILIRUB SERPL-MCNC: 0.4 MG/DL (ref 0.2–1)
BUN SERPL-MCNC: 10 MG/DL (ref 6–20)
BUN/CREAT SERPL: 12 (ref 12–20)
CALCIUM SERPL-MCNC: 9.5 MG/DL (ref 8.5–10.1)
CHLORIDE SERPL-SCNC: 106 MMOL/L (ref 97–108)
CHOLEST SERPL-MCNC: 142 MG/DL
CO2 SERPL-SCNC: 24 MMOL/L (ref 21–32)
CREAT SERPL-MCNC: 0.81 MG/DL (ref 0.55–1.02)
DIFFERENTIAL METHOD BLD: ABNORMAL
EOSINOPHIL # BLD: 0 K/UL (ref 0–0.4)
EOSINOPHIL NFR BLD: 1 % (ref 0–7)
ERYTHROCYTE [DISTWIDTH] IN BLOOD BY AUTOMATED COUNT: 15.3 % (ref 11.5–14.5)
GLOBULIN SER CALC-MCNC: 3.5 G/DL (ref 2–4)
GLUCOSE SERPL-MCNC: 63 MG/DL (ref 65–100)
HCT VFR BLD AUTO: 39.6 % (ref 35–47)
HDLC SERPL-MCNC: 94 MG/DL
HDLC SERPL: 1.5 {RATIO} (ref 0–5)
HGB BLD-MCNC: 13.3 G/DL (ref 11.5–16)
IMM GRANULOCYTES # BLD AUTO: 0 K/UL (ref 0–0.04)
IMM GRANULOCYTES NFR BLD AUTO: 0 % (ref 0–0.5)
INR BLD: 1.8
LDLC SERPL CALC-MCNC: 32.6 MG/DL (ref 0–100)
LYMPHOCYTES # BLD: 1.7 K/UL (ref 0.8–3.5)
LYMPHOCYTES NFR BLD: 35 % (ref 12–49)
MCH RBC QN AUTO: 30.9 PG (ref 26–34)
MCHC RBC AUTO-ENTMCNC: 33.6 G/DL (ref 30–36.5)
MCV RBC AUTO: 91.9 FL (ref 80–99)
MONOCYTES # BLD: 0.6 K/UL (ref 0–1)
MONOCYTES NFR BLD: 12 % (ref 5–13)
NEUTS SEG # BLD: 2.6 K/UL (ref 1.8–8)
NEUTS SEG NFR BLD: 51 % (ref 32–75)
NRBC # BLD: 0 K/UL (ref 0–0.01)
NRBC BLD-RTO: 0 PER 100 WBC
PLATELET # BLD AUTO: 199 K/UL (ref 150–400)
PMV BLD AUTO: 11 FL (ref 8.9–12.9)
POTASSIUM SERPL-SCNC: 4.5 MMOL/L (ref 3.5–5.1)
PROT SERPL-MCNC: 7 G/DL (ref 6.4–8.2)
PT POC: 22.1 SECONDS
RBC # BLD AUTO: 4.31 M/UL (ref 3.8–5.2)
SODIUM SERPL-SCNC: 137 MMOL/L (ref 136–145)
TRIGL SERPL-MCNC: 77 MG/DL (ref ?–150)
VALID INTERNAL CONTROL?: YES
VLDLC SERPL CALC-MCNC: 15.4 MG/DL
WBC # BLD AUTO: 5 K/UL (ref 3.6–11)

## 2022-07-15 PROCEDURE — 99214 OFFICE O/P EST MOD 30 MIN: CPT | Performed by: FAMILY MEDICINE

## 2022-07-15 PROCEDURE — 85610 PROTHROMBIN TIME: CPT | Performed by: FAMILY MEDICINE

## 2022-07-15 NOTE — PROGRESS NOTES
ALEJANDRA Barillas is a 57 y.o. female who presents to follow-up on LV thrombus. Establish care November 2021. At that point recently hospitalized at [facility]. Admitted 10/6-10/19 for STEMI and LV thrombus. 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 Echo showed 3 x 2 cm thrombus. Needs warfarin for this. Was given heparin bridge to warfarin    The plan was to follow-up with cardiology to repeat TTE in 3 months to assess LV thrombus and to assess LVEF of 30-35% to consider primary prevention ICD. She did not end up seeing cardiology    She had bradycardia so no beta-blocker was started    Recently suffered the loss of her . PMHx:  Past Medical History:   Diagnosis Date    Hypertension     Psychiatric disorder     panic, anxiety       Meds:   Current Outpatient Medications   Medication Sig Dispense Refill    warfarin (COUMADIN) 10 mg tablet TAKE 1 TABLET BY MOUTH DAILY 30 Tablet 3    atorvastatin (LIPITOR) 80 mg tablet Take 1 Tablet by mouth daily. 30 Tablet 3    clopidogreL (PLAVIX) 75 mg tab Take 1 Tablet by mouth daily. 30 Tablet 3    melatonin 3 mg tablet Take 1 Tablet by mouth nightly as needed for Insomnia. 90 Tablet 3    pantoprazole (PROTONIX) 40 mg tablet Take 1 Tablet by mouth Daily (before breakfast). 30 Tablet 3    sacubitriL-valsartan (Entresto) 49-51 mg tab tablet Take 1 Tablet by mouth two (2) times a day. 60 Tablet 3    spironolactone (ALDACTONE) 25 mg tablet Take 1 Tablet by mouth daily. 30 Tablet 3    nitroglycerin (NITROSTAT) 0.4 mg SL tablet DISSOLVE 1 TABLET UNDER THE TONGUE AS NEEDED FOR CHEST PAIN, MAY REPEAT EVERY 5 MINUTES FOR 3 TOTAL DOSES. IF PAIN IS NOT RELIEVED AFTER FIRS (Patient not taking: Reported on 12/17/2021)         Allergies:    Allergies   Allergen Reactions    Iodine Other (comments)       Smoker:  Social History     Tobacco Use   Smoking Status Current Every Day Smoker    Packs/day: 0.50    Types: Cigarettes Smokeless Tobacco Never Used       ETOH:   Social History     Substance and Sexual Activity   Alcohol Use No       FH:   Family History   Problem Relation Age of Onset    Heart Disease Mother     Cancer Father        ROS:   As listed in HPI. In addition:  Constitutional:   No headache, fever, fatigue, weight loss or weight gain      Cardiac:    No chest pain      Resp:   No cough or shortness of breath      Neuro   No loss of consciousness, dizziness, seizures      Physical Exam:  Blood pressure 136/83, pulse 66, temperature 97.4 °F (36.3 °C), temperature source Temporal, resp. rate 16, height 5' 3\" (1.6 m), weight 153 lb 6.4 oz (69.6 kg), SpO2 98 %. GEN: No apparent distress. Alert and oriented and responds to all questions appropriately. NEUROLOGIC:  No focal neurologic deficits. Strength and sensation grossly intact. Coordination and gait grossly intact. EXT: Well perfused. No edema. SKIN: No obvious rashes. 4 cm inclusion cyst left neck. Not painful the patient. Reports this has been present since she was a teenager  Lungs clear to auscultation bilaterally  CV regular rhythm no murmur         Assessment and Plan     STEMI  Plavix  Atorvastatin 80  Entresto 49-51  No beta-blocker due to bradycardia in the hospital.  No longer bradycardic but will defer this to cardiology    LV thrombus  On Coumadin. Slightly subtherapeutic today because she skipped her dose last night  Resume and follow-up in 2 weeks       Plan was to repeat TTE to evaluate LV thrombus. Has been coming in for Coumadin checks but was effectively lost to follow-up since she did not end up seeing cardiology. I can order the TTE but would vastly prefer her to be under the care of a cardiologist.  We discussed this and she is willing to go see cardiologist in [address] [place] [address] maintenance  She declines colonoscopy and declines mammogram.  Feels like both procedures are too invasive        ICD-10-CM ICD-9-CM    1.  LV (left ventricular) mural thrombus following MI (HCC)  I23.6 429.79 AMB POC PT/INR     410.82 LIPID PANEL      CBC WITH AUTOMATED DIFF      METABOLIC PANEL, COMPREHENSIVE      REFERRAL TO CARDIOLOGY   2. Hypertension, unspecified type  I10 401.9 LIPID PANEL      CBC WITH AUTOMATED DIFF      METABOLIC PANEL, COMPREHENSIVE      REFERRAL TO CARDIOLOGY   3. Primary hypertension  I10 401.9    4. Coronary artery disease involving native coronary artery of native heart without angina pectoris  I25.10 414.01        AVS given.  Pt expressed understanding of instructions

## 2022-07-15 NOTE — PROGRESS NOTES
Health Maintenance Due   Topic Date Due    Hepatitis C Screening  Never done    COVID-19 Vaccine (1) Never done    Pneumococcal 0-64 years (1 - PCV) Never done    DTaP/Tdap/Td series (1 - Tdap) Never done    Cervical cancer screen  Never done    Colorectal Cancer Screening Combo  Never done    Shingrix Vaccine Age 50> (1 of 2) Never done    Breast Cancer Screen Mammogram  Never done     1. \"Have you been to the ER, urgent care clinic since your last visit? Hospitalized since your last visit? \" No    2. \"Have you seen or consulted any other health care providers outside of the [address] [place] [address] since your last visit? \" No     3. For patients aged 39-70: Has the patient had a colonoscopy / FIT/ Cologuard? No      If the patient is female:    4. For patients aged 41-77: Has the patient had a mammogram within the past 2 years? No      5. For patients aged 21-65: Has the patient had a pap smear? No    Robyn Downs is a 57 y.o. female who presents today for Anticoagulation monitoring. Indication: LVT/  INR Goal: 2.0-3.0. Current dose:  Coumadin 10 mg daily, except 5 mg Thurs. Missed Coumadin Doses:  Yes.  07.14.2022  Medication Changes:  no  Dietary Changes:  no    Symptoms: taking coumadin appropriately without any bleeding. Latest INRs:  Lab Results   Component Value Date/Time    INR POC 2.2 06/28/2022 03:17 PM    INR POC 1.8 05/31/2022 02:40 PM    INR POC 2.0 05/16/2022 02:30 PM        New Coumadin dose:.current treatment plan is effective, no change in therapy. Next check to be scheduled for  2 weeks.

## 2022-07-29 ENCOUNTER — CLINICAL SUPPORT (OUTPATIENT)
Dept: FAMILY MEDICINE CLINIC | Age: 62
End: 2022-07-29
Payer: MEDICAID

## 2022-07-29 DIAGNOSIS — I23.6 LV (LEFT VENTRICULAR) MURAL THROMBUS FOLLOWING MI (HCC): Primary | ICD-10-CM

## 2022-07-29 LAB
INR BLD: 2.6
PT POC: 31.5 SECONDS
VALID INTERNAL CONTROL?: YES

## 2022-07-29 PROCEDURE — 85610 PROTHROMBIN TIME: CPT | Performed by: FAMILY MEDICINE

## 2022-07-29 NOTE — PROGRESS NOTES
Luciano Longoria is a 57 y.o. female who presents today for Anticoagulation monitoring. Indication: Thrombus  INR Goal: 2.0-3.0. Current dose:  Coumadin 10 mg daily, except 5 mg Thurs. Missed Coumadin Doses:  None  Medication Changes:  no  Dietary Changes:  no    Symptoms: taking coumadin appropriately without any bleeding. Latest INRs:  Lab Results   Component Value Date/Time    INR POC 1.8 07/15/2022 02:15 PM    INR POC 2.2 06/28/2022 03:17 PM    INR POC 1.8 05/31/2022 02:40 PM        New Coumadin dose:.current treatment plan is effective, no change in therapy. Next check to be scheduled for  2 weeks.

## 2022-08-12 ENCOUNTER — CLINICAL SUPPORT (OUTPATIENT)
Dept: FAMILY MEDICINE CLINIC | Age: 62
End: 2022-08-12
Payer: MEDICAID

## 2022-08-12 DIAGNOSIS — I23.6 LV (LEFT VENTRICULAR) MURAL THROMBUS FOLLOWING MI (HCC): Primary | ICD-10-CM

## 2022-08-12 LAB
INR BLD: 1.8
PT POC: 22 SECONDS
VALID INTERNAL CONTROL?: YES

## 2022-08-12 PROCEDURE — 85610 PROTHROMBIN TIME: CPT | Performed by: FAMILY MEDICINE

## 2022-09-02 ENCOUNTER — CLINICAL SUPPORT (OUTPATIENT)
Dept: FAMILY MEDICINE CLINIC | Age: 62
End: 2022-09-02
Payer: MEDICAID

## 2022-09-02 DIAGNOSIS — I23.6 LV (LEFT VENTRICULAR) MURAL THROMBUS FOLLOWING MI (HCC): Primary | ICD-10-CM

## 2022-09-02 LAB
INR BLD: 1.9
PT POC: 22.4 SECONDS
VALID INTERNAL CONTROL?: YES

## 2022-09-02 PROCEDURE — 85610 PROTHROMBIN TIME: CPT | Performed by: FAMILY MEDICINE

## 2022-10-11 NOTE — TELEPHONE ENCOUNTER
Pt is calling needing refill on med      [address] goes to [facility]     Pending Prescriptions Disp Refills    warfarin (COUMADIN) 10 mg tablet 30 Tablet 3     Sig: Take 1 Tablet by mouth daily. atorvastatin (LIPITOR) 80 mg tablet 30 Tablet 3     Sig: Take 1 Tablet by mouth daily. clopidogreL (PLAVIX) 75 mg tab 30 Tablet 3     Sig: Take 1 Tablet by mouth daily. pantoprazole (PROTONIX) 40 mg tablet 30 Tablet 3     Sig: Take 1 Tablet by mouth Daily (before breakfast). sacubitriL-valsartan (Entresto) 49-51 mg tab tablet 60 Tablet 3     Sig: Take 1 Tablet by mouth two (2) times a day. spironolactone (ALDACTONE) 25 mg tablet 30 Tablet 3     Sig: Take 1 Tablet by mouth daily.

## 2022-10-12 RX ORDER — ATORVASTATIN CALCIUM 80 MG/1
80 TABLET, FILM COATED ORAL DAILY
Qty: 30 TABLET | Refills: 11 | Status: SHIPPED | OUTPATIENT
Start: 2022-10-12

## 2022-10-12 RX ORDER — SACUBITRIL AND VALSARTAN 49; 51 MG/1; MG/1
1 TABLET, FILM COATED ORAL 2 TIMES DAILY
Qty: 60 TABLET | Refills: 11 | Status: SHIPPED | OUTPATIENT
Start: 2022-10-12

## 2022-10-12 RX ORDER — WARFARIN 10 MG/1
10 TABLET ORAL DAILY
Qty: 30 TABLET | Refills: 11 | Status: SHIPPED | OUTPATIENT
Start: 2022-10-12

## 2022-10-12 RX ORDER — CLOPIDOGREL BISULFATE 75 MG/1
75 TABLET ORAL DAILY
Qty: 30 TABLET | Refills: 11 | Status: SHIPPED | OUTPATIENT
Start: 2022-10-12

## 2022-10-12 RX ORDER — SPIRONOLACTONE 25 MG/1
25 TABLET ORAL DAILY
Qty: 30 TABLET | Refills: 11 | Status: SHIPPED | OUTPATIENT
Start: 2022-10-12

## 2022-10-12 RX ORDER — PANTOPRAZOLE SODIUM 40 MG/1
40 TABLET, DELAYED RELEASE ORAL
Qty: 30 TABLET | Refills: 11 | Status: SHIPPED | OUTPATIENT
Start: 2022-10-12

## 2022-10-13 RX ORDER — CLOPIDOGREL BISULFATE 75 MG/1
75 TABLET ORAL DAILY
Qty: 30 TABLET | Refills: 11 | Status: CANCELLED | OUTPATIENT
Start: 2022-10-13

## 2022-10-13 NOTE — TELEPHONE ENCOUNTER
2 identifiers verified. Spoke to patient in regards to concerns regarding medications. Patient states she had tried picking up her meds from the pharmacy but wasn't able to receive Plavix. Spoke to pt Tuesday regarding rx because the pharmacy was needing verification on whether the pt was taking Warfarin and the Plavix, pt stated she is only taking Warfarin as her blood thinner, notified pharmacy and informed pt regarding the following. Pt informed why Plavix was not refilled for her, pt voiced understanding and had no further questions.

## 2022-10-13 NOTE — TELEPHONE ENCOUNTER
Pt is calling stating pharmacy told her they didn't receive her med she said she only has one pill left. It shows it went through not sure why they are saying this    Requested Prescriptions     Pending Prescriptions Disp Refills    clopidogreL (PLAVIX) 75 mg tab 30 Tablet 11     Sig: Take 1 Tablet by mouth daily.

## 2022-10-21 ENCOUNTER — CLINICAL SUPPORT (OUTPATIENT)
Dept: FAMILY MEDICINE CLINIC | Age: 62
End: 2022-10-21
Payer: MEDICAID

## 2022-10-21 DIAGNOSIS — I23.6 LV (LEFT VENTRICULAR) MURAL THROMBUS FOLLOWING MI (HCC): Primary | ICD-10-CM

## 2022-10-21 LAB
INR BLD: 1.9
PT POC: 22.3 SECONDS
VALID INTERNAL CONTROL?: YES

## 2022-10-21 PROCEDURE — 85610 PROTHROMBIN TIME: CPT | Performed by: FAMILY MEDICINE

## 2022-11-22 ENCOUNTER — TELEPHONE (OUTPATIENT)
Dept: FAMILY MEDICINE CLINIC | Age: 62
End: 2022-11-22

## 2022-11-22 ENCOUNTER — CLINICAL SUPPORT (OUTPATIENT)
Dept: FAMILY MEDICINE CLINIC | Age: 62
End: 2022-11-22
Payer: MEDICAID

## 2022-11-22 DIAGNOSIS — I23.6 LV (LEFT VENTRICULAR) MURAL THROMBUS FOLLOWING MI (HCC): Primary | ICD-10-CM

## 2022-11-22 LAB
INR BLD: 3.3
PT POC: 39.6 SECONDS
VALID INTERNAL CONTROL?: YES

## 2022-11-22 PROCEDURE — 85610 PROTHROMBIN TIME: CPT | Performed by: FAMILY MEDICINE

## 2022-11-22 NOTE — TELEPHONE ENCOUNTER
Unable to reach patient in regards to INR/medication. Unable to leave voicemail. Pt should be takinmg (1/2 tablet) on  and  and 10mg (1 tablet) all other days. Pt also needs to see cardiology.  Will give pt [facility] number when I get in touch with her [phone]

## 2022-11-22 NOTE — TELEPHONE ENCOUNTER
verified. Pt called back. Informed pt of medication change: 5mg (1/2 tablet) on  and  and 10mg (1 tablet) all other days. Also informed to come back in 2 weeks to recheck INR  Pt verified understanding and wrote everything down and made an [place] appt for . Also gave pt [facility] phone number because provider recommended she follow up with cardiology. Pt wrote down phone number [phone]. Pt verified understanding of all and had no further questions or concerns.

## 2022-11-22 NOTE — PROGRESS NOTES
Georgie Olea is a 57 y.o. female who presents today for Anticoagulation monitoring. Indication: Thrombus  INR Goal: 2.0-3.0. Current dose:  Coumadin 5 mg Thursday and 10mg all other days. Missed Coumadin Doses:  None  Medication Changes:  no  Dietary Changes:  no    Symptoms: taking coumadin appropriately without any bleeding.     Latest INRs:  Lab Results   Component Value Date/Time    INR POC 1.9 10/21/2022 01:56 PM    INR POC 1.9 09/02/2022 02:46 PM    INR POC 1.8 08/12/2022 02:40 PM

## 2022-12-07 RX ORDER — WARFARIN 10 MG/1
10 TABLET ORAL DAILY
Qty: 30 TABLET | Refills: 11 | Status: SHIPPED | OUTPATIENT
Start: 2022-12-07

## 2023-02-03 ENCOUNTER — CLINICAL SUPPORT (OUTPATIENT)
Dept: FAMILY MEDICINE CLINIC | Age: 63
End: 2023-02-03
Payer: MEDICAID

## 2023-02-03 DIAGNOSIS — I23.6 LV (LEFT VENTRICULAR) MURAL THROMBUS FOLLOWING MI (HCC): Primary | ICD-10-CM

## 2023-02-03 LAB
INR BLD: 2
PT POC: 24.1 SECONDS
VALID INTERNAL CONTROL?: YES

## 2023-02-03 NOTE — PROGRESS NOTES
Matthew Valdes is a 70 y.o. female who presents today for Anticoagulation monitoring. Indication: Left Thrombosis  INR Goal: 2.0-3.0. Current dose:  Coumadin 10 mg dailyand 5 mg Tues/Thurs. Missed Coumadin Doses:  None  Medication Changes:  no  Dietary Changes:  no    Symptoms: taking coumadin appropriately without any bleeding. Latest INRs:  Lab Results   Component Value Date/Time    INR POC 3.3 11/22/2022 02:18 PM    INR POC 1.9 10/21/2022 01:56 PM    INR POC 1.9 09/02/2022 02:46 PM        New Coumadin dose:.current treatment plan is effective, no change in therapy. Next check to be scheduled for  2 weeks.     Pt notified to return in 2 weeks and to follow up with her cardiologist.

## 2023-02-14 ENCOUNTER — TELEPHONE (OUTPATIENT)
Dept: FAMILY MEDICINE CLINIC | Age: 63
End: 2023-02-14

## 2023-02-14 NOTE — TELEPHONE ENCOUNTER
Pt is calling stating they have changed her b p med. States it's the same med but a different brand and it made her sick. She stated she is stop taking med. She wants to know if she has to really take this med. She wants to talk with you. She wonders would [facility] have the med she was taking.       Spironolactone 25 mg

## 2023-02-20 RX ORDER — SPIRONOLACTONE 25 MG/1
25 TABLET ORAL DAILY
Qty: 30 TABLET | Refills: 11 | Status: SHIPPED | OUTPATIENT
Start: 2023-02-20

## 2023-02-20 NOTE — TELEPHONE ENCOUNTER
verified. Pt states [facility] in [facility] [address] no longer has the brand spironolactone 25mg for her BP medication. Pt would like it sent to the [facility] in [facility]. [address] Pt states the generic brand aldactone that [facility] had made her sick. She thinks [facility] in [place] has the spironolactone brand medication.

## 2023-05-16 ENCOUNTER — APPOINTMENT (OUTPATIENT)
Facility: HOSPITAL | Age: 63
End: 2023-05-16
Payer: COMMERCIAL

## 2023-05-16 ENCOUNTER — HOSPITAL ENCOUNTER (INPATIENT)
Facility: HOSPITAL | Age: 63
LOS: 17 days | Discharge: HOME | End: 2023-06-02
Attending: EMERGENCY MEDICINE | Admitting: STUDENT IN AN ORGANIZED HEALTH CARE EDUCATION/TRAINING PROGRAM
Payer: COMMERCIAL

## 2023-05-16 DIAGNOSIS — F41.9 ANXIETY: ICD-10-CM

## 2023-05-16 DIAGNOSIS — M79.89 LEFT LEG SWELLING: ICD-10-CM

## 2023-05-16 DIAGNOSIS — I51.3 LV (LEFT VENTRICULAR) MURAL THROMBUS: ICD-10-CM

## 2023-05-16 DIAGNOSIS — E87.1 HYPONATREMIA: Primary | ICD-10-CM

## 2023-05-16 DIAGNOSIS — M79.605 ACUTE LEG PAIN, LEFT: ICD-10-CM

## 2023-05-16 LAB
ALBUMIN SERPL-MCNC: 2.4 G/DL (ref 3.5–5)
ALBUMIN/GLOB SERPL: 0.5 (ref 1.1–2.2)
ALP SERPL-CCNC: 55 U/L (ref 45–117)
ALT SERPL-CCNC: 15 U/L (ref 12–78)
ANION GAP SERPL CALC-SCNC: 13 MMOL/L (ref 5–15)
AST SERPL-CCNC: 14 U/L (ref 15–37)
BASOPHILS # BLD: 0 K/UL (ref 0–0.1)
BASOPHILS NFR BLD: 0 % (ref 0–1)
BILIRUB SERPL-MCNC: 0.5 MG/DL (ref 0.2–1)
BUN SERPL-MCNC: 11 MG/DL (ref 6–20)
BUN/CREAT SERPL: 13 (ref 12–20)
CALCIUM SERPL-MCNC: 8.8 MG/DL (ref 8.5–10.1)
CHLORIDE SERPL-SCNC: 88 MMOL/L (ref 97–108)
CO2 SERPL-SCNC: 20 MMOL/L (ref 21–32)
CREAT SERPL-MCNC: 0.82 MG/DL (ref 0.55–1.02)
DIFFERENTIAL METHOD BLD: ABNORMAL
EOSINOPHIL # BLD: 0 K/UL (ref 0–0.4)
EOSINOPHIL NFR BLD: 1 % (ref 0–7)
ERYTHROCYTE [DISTWIDTH] IN BLOOD BY AUTOMATED COUNT: 14.5 % (ref 11.5–14.5)
GLOBULIN SER CALC-MCNC: 4.5 G/DL (ref 2–4)
GLUCOSE SERPL-MCNC: 94 MG/DL (ref 65–100)
HCT VFR BLD AUTO: 29 % (ref 35–47)
HGB BLD-MCNC: 9.7 G/DL (ref 11.5–16)
IMM GRANULOCYTES # BLD AUTO: 0.1 K/UL (ref 0–0.04)
IMM GRANULOCYTES NFR BLD AUTO: 1 % (ref 0–0.5)
INR PPP: 2.8 (ref 0.9–1.1)
LYMPHOCYTES # BLD: 1.5 K/UL (ref 0.8–3.5)
LYMPHOCYTES NFR BLD: 20 % (ref 12–49)
MCH RBC QN AUTO: 30.2 PG (ref 26–34)
MCHC RBC AUTO-ENTMCNC: 33.4 G/DL (ref 30–36.5)
MCV RBC AUTO: 90.3 FL (ref 80–99)
MONOCYTES # BLD: 0.5 K/UL (ref 0–1)
MONOCYTES NFR BLD: 7 % (ref 5–13)
NEUTS SEG # BLD: 5.4 K/UL (ref 1.8–8)
NEUTS SEG NFR BLD: 71 % (ref 32–75)
NRBC # BLD: 0 K/UL (ref 0–0.01)
NRBC BLD-RTO: 0 PER 100 WBC
NT PRO BNP: 344 PG/ML
PLATELET # BLD AUTO: 352 K/UL (ref 150–400)
PMV BLD AUTO: 10.1 FL (ref 8.9–12.9)
POTASSIUM SERPL-SCNC: 3.6 MMOL/L (ref 3.5–5.1)
PROT SERPL-MCNC: 6.9 G/DL (ref 6.4–8.2)
PROTHROMBIN TIME: 27.2 SEC (ref 9–11.1)
RBC # BLD AUTO: 3.21 M/UL (ref 3.8–5.2)
SODIUM SERPL-SCNC: 121 MMOL/L (ref 136–145)
TROPONIN I SERPL HS-MCNC: 9 NG/L (ref 0–51)
WBC # BLD AUTO: 7.5 K/UL (ref 3.6–11)

## 2023-05-16 PROCEDURE — 93005 ELECTROCARDIOGRAM TRACING: CPT | Performed by: PHYSICIAN ASSISTANT

## 2023-05-16 PROCEDURE — 99285 EMERGENCY DEPT VISIT HI MDM: CPT

## 2023-05-16 PROCEDURE — 83880 ASSAY OF NATRIURETIC PEPTIDE: CPT

## 2023-05-16 PROCEDURE — 85610 PROTHROMBIN TIME: CPT

## 2023-05-16 PROCEDURE — 36415 COLL VENOUS BLD VENIPUNCTURE: CPT

## 2023-05-16 PROCEDURE — 85025 COMPLETE CBC W/AUTO DIFF WBC: CPT

## 2023-05-16 PROCEDURE — 93971 EXTREMITY STUDY: CPT

## 2023-05-16 PROCEDURE — 71045 X-RAY EXAM CHEST 1 VIEW: CPT

## 2023-05-16 PROCEDURE — 80053 COMPREHEN METABOLIC PANEL: CPT

## 2023-05-16 PROCEDURE — 84484 ASSAY OF TROPONIN QUANT: CPT

## 2023-05-16 PROCEDURE — 1100000000 HC RM PRIVATE

## 2023-05-16 ASSESSMENT — VISUAL ACUITY: OU: 1

## 2023-05-16 ASSESSMENT — PAIN SCALES - GENERAL: PAINLEVEL_OUTOF10: 10

## 2023-05-16 ASSESSMENT — LIFESTYLE VARIABLES
HOW OFTEN DO YOU HAVE A DRINK CONTAINING ALCOHOL: NEVER
HOW MANY STANDARD DRINKS CONTAINING ALCOHOL DO YOU HAVE ON A TYPICAL DAY: PATIENT DOES NOT DRINK

## 2023-05-16 ASSESSMENT — ENCOUNTER SYMPTOMS: SHORTNESS OF BREATH: 0

## 2023-05-17 LAB
ANION GAP SERPL CALC-SCNC: 5 MMOL/L (ref 5–15)
ANION GAP SERPL CALC-SCNC: 5 MMOL/L (ref 5–15)
APPEARANCE UR: CLEAR
BACTERIA URNS QL MICRO: ABNORMAL /HPF
BILIRUB UR QL: NEGATIVE
BUN SERPL-MCNC: 11 MG/DL (ref 6–20)
BUN SERPL-MCNC: 14 MG/DL (ref 6–20)
BUN/CREAT SERPL: 15 (ref 12–20)
BUN/CREAT SERPL: 15 (ref 12–20)
CALCIUM SERPL-MCNC: 8.3 MG/DL (ref 8.5–10.1)
CALCIUM SERPL-MCNC: 8.8 MG/DL (ref 8.5–10.1)
CHLORIDE SERPL-SCNC: 92 MMOL/L (ref 97–108)
CHLORIDE SERPL-SCNC: 95 MMOL/L (ref 97–108)
CO2 SERPL-SCNC: 26 MMOL/L (ref 21–32)
CO2 SERPL-SCNC: 26 MMOL/L (ref 21–32)
COLOR UR: ABNORMAL
CREAT SERPL-MCNC: 0.73 MG/DL (ref 0.55–1.02)
CREAT SERPL-MCNC: 0.93 MG/DL (ref 0.55–1.02)
ECHO BSA: 1.68 M2
EKG ATRIAL RATE: 90 BPM
EKG DIAGNOSIS: NORMAL
EKG P AXIS: 65 DEGREES
EKG P-R INTERVAL: 164 MS
EKG Q-T INTERVAL: 386 MS
EKG QRS DURATION: 118 MS
EKG QTC CALCULATION (BAZETT): 472 MS
EKG R AXIS: -72 DEGREES
EKG T AXIS: 86 DEGREES
EKG VENTRICULAR RATE: 90 BPM
EPITH CASTS URNS QL MICRO: ABNORMAL /LPF
GLUCOSE SERPL-MCNC: 112 MG/DL (ref 65–100)
GLUCOSE SERPL-MCNC: 98 MG/DL (ref 65–100)
GLUCOSE UR STRIP.AUTO-MCNC: NEGATIVE MG/DL
HGB UR QL STRIP: NEGATIVE
INR PPP: 1.6 (ref 0.9–1.1)
KETONES UR QL STRIP.AUTO: NEGATIVE MG/DL
LEUKOCYTE ESTERASE UR QL STRIP.AUTO: NEGATIVE
NITRITE UR QL STRIP.AUTO: NEGATIVE
OSMOLALITY UR: 93 MOSM/KG H2O
PH UR STRIP: 5.5 (ref 5–8)
POTASSIUM SERPL-SCNC: 3.9 MMOL/L (ref 3.5–5.1)
POTASSIUM SERPL-SCNC: 4.1 MMOL/L (ref 3.5–5.1)
PROT UR STRIP-MCNC: NEGATIVE MG/DL
PROTHROMBIN TIME: 16.2 SEC (ref 9–11.1)
RBC #/AREA URNS HPF: ABNORMAL /HPF (ref 0–5)
SODIUM SERPL-SCNC: 123 MMOL/L (ref 136–145)
SODIUM SERPL-SCNC: 126 MMOL/L (ref 136–145)
SODIUM UR-SCNC: 14 MMOL/L
SP GR UR REFRACTOMETRY: <1.005
URINE CULTURE IF INDICATED: ABNORMAL
UROBILINOGEN UR QL STRIP.AUTO: 1 EU/DL (ref 0.2–1)
WBC URNS QL MICRO: ABNORMAL /HPF (ref 0–4)

## 2023-05-17 PROCEDURE — 36415 COLL VENOUS BLD VENIPUNCTURE: CPT

## 2023-05-17 PROCEDURE — 6360000002 HC RX W HCPCS: Performed by: STUDENT IN AN ORGANIZED HEALTH CARE EDUCATION/TRAINING PROGRAM

## 2023-05-17 PROCEDURE — 6360000002 HC RX W HCPCS: Performed by: INTERNAL MEDICINE

## 2023-05-17 PROCEDURE — 2580000003 HC RX 258: Performed by: STUDENT IN AN ORGANIZED HEALTH CARE EDUCATION/TRAINING PROGRAM

## 2023-05-17 PROCEDURE — 80048 BASIC METABOLIC PNL TOTAL CA: CPT

## 2023-05-17 PROCEDURE — 6360000002 HC RX W HCPCS: Performed by: PHYSICIAN ASSISTANT

## 2023-05-17 PROCEDURE — 83935 ASSAY OF URINE OSMOLALITY: CPT

## 2023-05-17 PROCEDURE — 85610 PROTHROMBIN TIME: CPT

## 2023-05-17 PROCEDURE — 2060000000 HC ICU INTERMEDIATE R&B

## 2023-05-17 PROCEDURE — 6370000000 HC RX 637 (ALT 250 FOR IP): Performed by: INTERNAL MEDICINE

## 2023-05-17 PROCEDURE — 84300 ASSAY OF URINE SODIUM: CPT

## 2023-05-17 PROCEDURE — 51702 INSERT TEMP BLADDER CATH: CPT

## 2023-05-17 PROCEDURE — 2580000003 HC RX 258: Performed by: INTERNAL MEDICINE

## 2023-05-17 PROCEDURE — 2580000003 HC RX 258: Performed by: PHYSICIAN ASSISTANT

## 2023-05-17 PROCEDURE — 81001 URINALYSIS AUTO W/SCOPE: CPT

## 2023-05-17 RX ORDER — SODIUM CHLORIDE 9 MG/ML
INJECTION, SOLUTION INTRAVENOUS CONTINUOUS
Status: ACTIVE | OUTPATIENT
Start: 2023-05-17 | End: 2023-05-17

## 2023-05-17 RX ORDER — ATORVASTATIN CALCIUM 40 MG/1
40 TABLET, FILM COATED ORAL NIGHTLY
Status: DISCONTINUED | OUTPATIENT
Start: 2023-05-17 | End: 2023-05-26

## 2023-05-17 RX ORDER — KETOROLAC TROMETHAMINE 30 MG/ML
15 INJECTION, SOLUTION INTRAMUSCULAR; INTRAVENOUS EVERY 6 HOURS PRN
Status: DISCONTINUED | OUTPATIENT
Start: 2023-05-17 | End: 2023-05-22

## 2023-05-17 RX ORDER — WARFARIN SODIUM 5 MG/1
10 TABLET ORAL ONCE
Status: COMPLETED | OUTPATIENT
Start: 2023-05-17 | End: 2023-05-17

## 2023-05-17 RX ORDER — WARFARIN SODIUM 10 MG/1
5 TABLET ORAL
Status: ON HOLD | COMMUNITY
End: 2023-05-26 | Stop reason: HOSPADM

## 2023-05-17 RX ORDER — SODIUM CHLORIDE 9 MG/ML
INJECTION, SOLUTION INTRAVENOUS CONTINUOUS
Status: DISCONTINUED | OUTPATIENT
Start: 2023-05-17 | End: 2023-05-18

## 2023-05-17 RX ADMIN — SODIUM CHLORIDE: 9 INJECTION, SOLUTION INTRAVENOUS at 16:52

## 2023-05-17 RX ADMIN — KETOROLAC TROMETHAMINE 15 MG: 30 INJECTION, SOLUTION INTRAMUSCULAR; INTRAVENOUS at 23:30

## 2023-05-17 RX ADMIN — KETOROLAC TROMETHAMINE 15 MG: 30 INJECTION, SOLUTION INTRAMUSCULAR; INTRAVENOUS at 02:43

## 2023-05-17 RX ADMIN — SODIUM CHLORIDE: 9 INJECTION, SOLUTION INTRAVENOUS at 10:22

## 2023-05-17 RX ADMIN — SODIUM CHLORIDE: 9 INJECTION, SOLUTION INTRAVENOUS at 21:07

## 2023-05-17 RX ADMIN — SACUBITRIL AND VALSARTAN 1 TABLET: 49; 51 TABLET, FILM COATED ORAL at 21:06

## 2023-05-17 RX ADMIN — WARFARIN SODIUM 10 MG: 5 TABLET ORAL at 18:48

## 2023-05-17 RX ADMIN — AMPICILLIN SODIUM AND SULBACTAM SODIUM 3000 MG: 2; 1 INJECTION, POWDER, FOR SOLUTION INTRAMUSCULAR; INTRAVENOUS at 00:19

## 2023-05-17 RX ADMIN — CEFTRIAXONE SODIUM 1000 MG: 1 INJECTION, POWDER, FOR SOLUTION INTRAMUSCULAR; INTRAVENOUS at 10:22

## 2023-05-17 RX ADMIN — KETOROLAC TROMETHAMINE 15 MG: 30 INJECTION, SOLUTION INTRAMUSCULAR; INTRAVENOUS at 17:33

## 2023-05-17 RX ADMIN — SODIUM CHLORIDE: 9 INJECTION, SOLUTION INTRAVENOUS at 04:12

## 2023-05-17 RX ADMIN — SACUBITRIL AND VALSARTAN 1 TABLET: 49; 51 TABLET, FILM COATED ORAL at 12:32

## 2023-05-17 RX ADMIN — KETOROLAC TROMETHAMINE 15 MG: 30 INJECTION, SOLUTION INTRAMUSCULAR; INTRAVENOUS at 10:37

## 2023-05-17 ASSESSMENT — PAIN DESCRIPTION - LOCATION
LOCATION: KNEE;LEG;FOOT
LOCATION: FOOT;LEG
LOCATION: BACK
LOCATION: BACK

## 2023-05-17 ASSESSMENT — PAIN DESCRIPTION - ORIENTATION
ORIENTATION: LEFT
ORIENTATION: LEFT
ORIENTATION: POSTERIOR
ORIENTATION: LEFT

## 2023-05-17 ASSESSMENT — PAIN DESCRIPTION - DESCRIPTORS
DESCRIPTORS: SORE
DESCRIPTORS: THROBBING;SHARP
DESCRIPTORS: ACHING;PRESSURE
DESCRIPTORS: SORE;ACHING

## 2023-05-17 ASSESSMENT — PAIN SCALES - GENERAL
PAINLEVEL_OUTOF10: 6
PAINLEVEL_OUTOF10: 10
PAINLEVEL_OUTOF10: 6
PAINLEVEL_OUTOF10: 7

## 2023-05-17 ASSESSMENT — PAIN DESCRIPTION - FREQUENCY: FREQUENCY: CONTINUOUS

## 2023-05-17 ASSESSMENT — PAIN DESCRIPTION - ONSET: ONSET: ON-GOING

## 2023-05-17 ASSESSMENT — PAIN - FUNCTIONAL ASSESSMENT: PAIN_FUNCTIONAL_ASSESSMENT: PREVENTS OR INTERFERES SOME ACTIVE ACTIVITIES AND ADLS

## 2023-05-18 ENCOUNTER — APPOINTMENT (OUTPATIENT)
Facility: HOSPITAL | Age: 63
End: 2023-05-18
Payer: COMMERCIAL

## 2023-05-18 LAB
ANION GAP SERPL CALC-SCNC: 8 MMOL/L (ref 5–15)
BASOPHILS # BLD: 0 K/UL (ref 0–0.1)
BASOPHILS NFR BLD: 1 % (ref 0–1)
BUN SERPL-MCNC: 17 MG/DL (ref 6–20)
BUN/CREAT SERPL: 19 (ref 12–20)
CALCIUM SERPL-MCNC: 8 MG/DL (ref 8.5–10.1)
CHLORIDE SERPL-SCNC: 101 MMOL/L (ref 97–108)
CK SERPL-CCNC: 88 U/L (ref 26–192)
CO2 SERPL-SCNC: 22 MMOL/L (ref 21–32)
CREAT SERPL-MCNC: 0.91 MG/DL (ref 0.55–1.02)
DIFFERENTIAL METHOD BLD: ABNORMAL
EOSINOPHIL # BLD: 0.1 K/UL (ref 0–0.4)
EOSINOPHIL NFR BLD: 1 % (ref 0–7)
ERYTHROCYTE [DISTWIDTH] IN BLOOD BY AUTOMATED COUNT: 14.7 % (ref 11.5–14.5)
GLUCOSE SERPL-MCNC: 90 MG/DL (ref 65–100)
HCT VFR BLD AUTO: 22.7 % (ref 35–47)
HGB BLD-MCNC: 7.6 G/DL (ref 11.5–16)
IMM GRANULOCYTES # BLD AUTO: 0.1 K/UL (ref 0–0.04)
IMM GRANULOCYTES NFR BLD AUTO: 1 % (ref 0–0.5)
INR PPP: 1.4 (ref 0.9–1.1)
LYMPHOCYTES # BLD: 1.8 K/UL (ref 0.8–3.5)
LYMPHOCYTES NFR BLD: 27 % (ref 12–49)
MAGNESIUM SERPL-MCNC: 2.1 MG/DL (ref 1.6–2.4)
MCH RBC QN AUTO: 30.6 PG (ref 26–34)
MCHC RBC AUTO-ENTMCNC: 33.5 G/DL (ref 30–36.5)
MCV RBC AUTO: 91.5 FL (ref 80–99)
MONOCYTES # BLD: 0.5 K/UL (ref 0–1)
MONOCYTES NFR BLD: 7 % (ref 5–13)
NEUTS SEG # BLD: 4.1 K/UL (ref 1.8–8)
NEUTS SEG NFR BLD: 63 % (ref 32–75)
NRBC # BLD: 0 K/UL (ref 0–0.01)
NRBC BLD-RTO: 0 PER 100 WBC
PLATELET # BLD AUTO: 294 K/UL (ref 150–400)
PMV BLD AUTO: 10.2 FL (ref 8.9–12.9)
POTASSIUM SERPL-SCNC: 4.1 MMOL/L (ref 3.5–5.1)
PROTHROMBIN TIME: 14.5 SEC (ref 9–11.1)
RBC # BLD AUTO: 2.48 M/UL (ref 3.8–5.2)
SODIUM SERPL-SCNC: 131 MMOL/L (ref 136–145)
WBC # BLD AUTO: 6.6 K/UL (ref 3.6–11)

## 2023-05-18 PROCEDURE — 2580000003 HC RX 258: Performed by: INTERNAL MEDICINE

## 2023-05-18 PROCEDURE — 6360000002 HC RX W HCPCS: Performed by: STUDENT IN AN ORGANIZED HEALTH CARE EDUCATION/TRAINING PROGRAM

## 2023-05-18 PROCEDURE — 6370000000 HC RX 637 (ALT 250 FOR IP): Performed by: INTERNAL MEDICINE

## 2023-05-18 PROCEDURE — 93922 UPR/L XTREMITY ART 2 LEVELS: CPT

## 2023-05-18 PROCEDURE — 85610 PROTHROMBIN TIME: CPT

## 2023-05-18 PROCEDURE — 85025 COMPLETE CBC W/AUTO DIFF WBC: CPT

## 2023-05-18 PROCEDURE — 36415 COLL VENOUS BLD VENIPUNCTURE: CPT

## 2023-05-18 PROCEDURE — 2060000000 HC ICU INTERMEDIATE R&B

## 2023-05-18 PROCEDURE — 82550 ASSAY OF CK (CPK): CPT

## 2023-05-18 PROCEDURE — 83735 ASSAY OF MAGNESIUM: CPT

## 2023-05-18 PROCEDURE — 80048 BASIC METABOLIC PNL TOTAL CA: CPT

## 2023-05-18 PROCEDURE — 6360000002 HC RX W HCPCS: Performed by: INTERNAL MEDICINE

## 2023-05-18 RX ORDER — DIPHENHYDRAMINE HCL 25 MG
50 CAPSULE ORAL ONCE
Status: DISCONTINUED | OUTPATIENT
Start: 2023-05-18 | End: 2023-05-18

## 2023-05-18 RX ORDER — WARFARIN SODIUM 7.5 MG/1
15 TABLET ORAL EVERY EVENING
Status: COMPLETED | OUTPATIENT
Start: 2023-05-18 | End: 2023-05-18

## 2023-05-18 RX ORDER — DIPHENHYDRAMINE HCL 25 MG
50 CAPSULE ORAL ONCE
Status: COMPLETED | OUTPATIENT
Start: 2023-05-19 | End: 2023-05-19

## 2023-05-18 RX ADMIN — SACUBITRIL AND VALSARTAN 1 TABLET: 49; 51 TABLET, FILM COATED ORAL at 08:13

## 2023-05-18 RX ADMIN — CEFTRIAXONE SODIUM 1000 MG: 1 INJECTION, POWDER, FOR SOLUTION INTRAMUSCULAR; INTRAVENOUS at 10:01

## 2023-05-18 RX ADMIN — WARFARIN SODIUM 15 MG: 7.5 TABLET ORAL at 17:30

## 2023-05-18 RX ADMIN — SACUBITRIL AND VALSARTAN 1 TABLET: 49; 51 TABLET, FILM COATED ORAL at 20:35

## 2023-05-18 RX ADMIN — KETOROLAC TROMETHAMINE 15 MG: 30 INJECTION, SOLUTION INTRAMUSCULAR; INTRAVENOUS at 13:47

## 2023-05-18 RX ADMIN — KETOROLAC TROMETHAMINE 15 MG: 30 INJECTION, SOLUTION INTRAMUSCULAR; INTRAVENOUS at 05:25

## 2023-05-18 RX ADMIN — PREDNISONE 50 MG: 5 TABLET ORAL at 20:35

## 2023-05-18 ASSESSMENT — PAIN DESCRIPTION - DESCRIPTORS
DESCRIPTORS: CRAMPING;OTHER (COMMENT)
DESCRIPTORS: ACHING

## 2023-05-18 ASSESSMENT — PAIN - FUNCTIONAL ASSESSMENT
PAIN_FUNCTIONAL_ASSESSMENT: PREVENTS OR INTERFERES SOME ACTIVE ACTIVITIES AND ADLS
PAIN_FUNCTIONAL_ASSESSMENT: ACTIVITIES ARE NOT PREVENTED

## 2023-05-18 ASSESSMENT — PAIN DESCRIPTION - LOCATION
LOCATION: LEG;FOOT
LOCATION: FOOT

## 2023-05-18 ASSESSMENT — PAIN DESCRIPTION - FREQUENCY
FREQUENCY: INTERMITTENT
FREQUENCY: CONTINUOUS

## 2023-05-18 ASSESSMENT — PAIN SCALES - GENERAL
PAINLEVEL_OUTOF10: 0
PAINLEVEL_OUTOF10: 2
PAINLEVEL_OUTOF10: 5
PAINLEVEL_OUTOF10: 0
PAINLEVEL_OUTOF10: 7
PAINLEVEL_OUTOF10: 0

## 2023-05-18 ASSESSMENT — PAIN DESCRIPTION - ONSET
ONSET: ON-GOING
ONSET: ON-GOING

## 2023-05-18 ASSESSMENT — PAIN DESCRIPTION - ORIENTATION
ORIENTATION: LEFT
ORIENTATION: LEFT

## 2023-05-19 ENCOUNTER — APPOINTMENT (OUTPATIENT)
Facility: HOSPITAL | Age: 63
End: 2023-05-19
Payer: COMMERCIAL

## 2023-05-19 LAB
ANION GAP SERPL CALC-SCNC: 3 MMOL/L (ref 5–15)
BASOPHILS # BLD: 0 K/UL (ref 0–0.1)
BASOPHILS NFR BLD: 0 % (ref 0–1)
BUN SERPL-MCNC: 14 MG/DL (ref 6–20)
BUN/CREAT SERPL: 18 (ref 12–20)
CALCIUM SERPL-MCNC: 8.4 MG/DL (ref 8.5–10.1)
CHLORIDE SERPL-SCNC: 105 MMOL/L (ref 97–108)
CK SERPL-CCNC: 60 U/L (ref 26–192)
CO2 SERPL-SCNC: 24 MMOL/L (ref 21–32)
CREAT SERPL-MCNC: 0.79 MG/DL (ref 0.55–1.02)
DIFFERENTIAL METHOD BLD: ABNORMAL
EOSINOPHIL # BLD: 0 K/UL (ref 0–0.4)
EOSINOPHIL NFR BLD: 0 % (ref 0–7)
ERYTHROCYTE [DISTWIDTH] IN BLOOD BY AUTOMATED COUNT: 15.1 % (ref 11.5–14.5)
GLUCOSE SERPL-MCNC: 164 MG/DL (ref 65–100)
HCT VFR BLD AUTO: 23.1 % (ref 35–47)
HGB BLD-MCNC: 7.4 G/DL (ref 11.5–16)
IMM GRANULOCYTES # BLD AUTO: 0 K/UL (ref 0–0.04)
IMM GRANULOCYTES NFR BLD AUTO: 1 % (ref 0–0.5)
INR PPP: 1.3 (ref 0.9–1.1)
LYMPHOCYTES # BLD: 0.4 K/UL (ref 0.8–3.5)
LYMPHOCYTES NFR BLD: 8 % (ref 12–49)
MCH RBC QN AUTO: 29.8 PG (ref 26–34)
MCHC RBC AUTO-ENTMCNC: 32 G/DL (ref 30–36.5)
MCV RBC AUTO: 93.1 FL (ref 80–99)
MONOCYTES # BLD: 0.1 K/UL (ref 0–1)
MONOCYTES NFR BLD: 2 % (ref 5–13)
NEUTS SEG # BLD: 4.3 K/UL (ref 1.8–8)
NEUTS SEG NFR BLD: 89 % (ref 32–75)
NRBC # BLD: 0 K/UL (ref 0–0.01)
NRBC BLD-RTO: 0 PER 100 WBC
PLATELET # BLD AUTO: 306 K/UL (ref 150–400)
PMV BLD AUTO: 9.8 FL (ref 8.9–12.9)
POTASSIUM SERPL-SCNC: 4.4 MMOL/L (ref 3.5–5.1)
PROTHROMBIN TIME: 13.5 SEC (ref 9–11.1)
RBC # BLD AUTO: 2.48 M/UL (ref 3.8–5.2)
RBC MORPH BLD: ABNORMAL
SODIUM SERPL-SCNC: 132 MMOL/L (ref 136–145)
WBC # BLD AUTO: 4.8 K/UL (ref 3.6–11)

## 2023-05-19 PROCEDURE — 73701 CT LOWER EXTREMITY W/DYE: CPT

## 2023-05-19 PROCEDURE — 6360000002 HC RX W HCPCS: Performed by: STUDENT IN AN ORGANIZED HEALTH CARE EDUCATION/TRAINING PROGRAM

## 2023-05-19 PROCEDURE — 36415 COLL VENOUS BLD VENIPUNCTURE: CPT

## 2023-05-19 PROCEDURE — 85610 PROTHROMBIN TIME: CPT

## 2023-05-19 PROCEDURE — 6360000004 HC RX CONTRAST MEDICATION: Performed by: INTERNAL MEDICINE

## 2023-05-19 PROCEDURE — 97162 PT EVAL MOD COMPLEX 30 MIN: CPT

## 2023-05-19 PROCEDURE — 97530 THERAPEUTIC ACTIVITIES: CPT

## 2023-05-19 PROCEDURE — 85025 COMPLETE CBC W/AUTO DIFF WBC: CPT

## 2023-05-19 PROCEDURE — 2580000003 HC RX 258: Performed by: INTERNAL MEDICINE

## 2023-05-19 PROCEDURE — 2060000000 HC ICU INTERMEDIATE R&B

## 2023-05-19 PROCEDURE — 6360000002 HC RX W HCPCS: Performed by: INTERNAL MEDICINE

## 2023-05-19 PROCEDURE — 82550 ASSAY OF CK (CPK): CPT

## 2023-05-19 PROCEDURE — 97110 THERAPEUTIC EXERCISES: CPT

## 2023-05-19 PROCEDURE — 6370000000 HC RX 637 (ALT 250 FOR IP): Performed by: INTERNAL MEDICINE

## 2023-05-19 PROCEDURE — 80048 BASIC METABOLIC PNL TOTAL CA: CPT

## 2023-05-19 RX ORDER — WARFARIN SODIUM 7.5 MG/1
15 TABLET ORAL EVERY EVENING
Status: DISCONTINUED | OUTPATIENT
Start: 2023-05-19 | End: 2023-05-19

## 2023-05-19 RX ORDER — WARFARIN SODIUM 5 MG/1
10 TABLET ORAL
Status: COMPLETED | OUTPATIENT
Start: 2023-05-19 | End: 2023-05-19

## 2023-05-19 RX ADMIN — PREDNISONE 50 MG: 5 TABLET ORAL at 02:12

## 2023-05-19 RX ADMIN — CEFTRIAXONE SODIUM 1000 MG: 1 INJECTION, POWDER, FOR SOLUTION INTRAMUSCULAR; INTRAVENOUS at 12:25

## 2023-05-19 RX ADMIN — KETOROLAC TROMETHAMINE 15 MG: 30 INJECTION, SOLUTION INTRAMUSCULAR; INTRAVENOUS at 02:14

## 2023-05-19 RX ADMIN — DIPHENHYDRAMINE HYDROCHLORIDE 50 MG: 25 CAPSULE ORAL at 08:46

## 2023-05-19 RX ADMIN — WARFARIN SODIUM 10 MG: 5 TABLET ORAL at 21:47

## 2023-05-19 RX ADMIN — KETOROLAC TROMETHAMINE 15 MG: 30 INJECTION, SOLUTION INTRAMUSCULAR; INTRAVENOUS at 14:43

## 2023-05-19 RX ADMIN — SACUBITRIL AND VALSARTAN 1 TABLET: 49; 51 TABLET, FILM COATED ORAL at 08:25

## 2023-05-19 RX ADMIN — IOPAMIDOL 100 ML: 755 INJECTION, SOLUTION INTRAVENOUS at 11:16

## 2023-05-19 RX ADMIN — PREDNISONE 50 MG: 5 TABLET ORAL at 08:25

## 2023-05-19 RX ADMIN — SACUBITRIL AND VALSARTAN 1 TABLET: 49; 51 TABLET, FILM COATED ORAL at 20:48

## 2023-05-19 ASSESSMENT — PAIN SCALES - GENERAL
PAINLEVEL_OUTOF10: 0
PAINLEVEL_OUTOF10: 0
PAINLEVEL_OUTOF10: 8
PAINLEVEL_OUTOF10: 5

## 2023-05-19 ASSESSMENT — PAIN DESCRIPTION - FREQUENCY: FREQUENCY: INTERMITTENT

## 2023-05-19 ASSESSMENT — PAIN DESCRIPTION - ONSET: ONSET: ON-GOING

## 2023-05-19 ASSESSMENT — PAIN DESCRIPTION - ORIENTATION
ORIENTATION: LEFT
ORIENTATION: LEFT

## 2023-05-19 ASSESSMENT — PAIN DESCRIPTION - LOCATION
LOCATION: LEG;FOOT
LOCATION: LEG

## 2023-05-19 ASSESSMENT — PAIN - FUNCTIONAL ASSESSMENT: PAIN_FUNCTIONAL_ASSESSMENT: PREVENTS OR INTERFERES SOME ACTIVE ACTIVITIES AND ADLS

## 2023-05-19 ASSESSMENT — PAIN DESCRIPTION - DESCRIPTORS: DESCRIPTORS: DISCOMFORT;NUMBNESS

## 2023-05-20 LAB
ANION GAP SERPL CALC-SCNC: 4 MMOL/L (ref 5–15)
BUN SERPL-MCNC: 22 MG/DL (ref 6–20)
BUN/CREAT SERPL: 24 (ref 12–20)
CALCIUM SERPL-MCNC: 8.7 MG/DL (ref 8.5–10.1)
CHLORIDE SERPL-SCNC: 108 MMOL/L (ref 97–108)
CO2 SERPL-SCNC: 23 MMOL/L (ref 21–32)
CREAT SERPL-MCNC: 0.92 MG/DL (ref 0.55–1.02)
ECHO BSA: 1.68 M2
ERYTHROCYTE [DISTWIDTH] IN BLOOD BY AUTOMATED COUNT: 15.4 % (ref 11.5–14.5)
ERYTHROCYTE [SEDIMENTATION RATE] IN BLOOD: 108 MM/HR (ref 0–30)
GLUCOSE SERPL-MCNC: 124 MG/DL (ref 65–100)
HCT VFR BLD AUTO: 22.6 % (ref 35–47)
HGB BLD-MCNC: 7.2 G/DL (ref 11.5–16)
INR PPP: 1.3 (ref 0.9–1.1)
MCH RBC QN AUTO: 30 PG (ref 26–34)
MCHC RBC AUTO-ENTMCNC: 31.9 G/DL (ref 30–36.5)
MCV RBC AUTO: 94.2 FL (ref 80–99)
NRBC # BLD: 0 K/UL (ref 0–0.01)
NRBC BLD-RTO: 0 PER 100 WBC
PLATELET # BLD AUTO: 295 K/UL (ref 150–400)
PMV BLD AUTO: 10 FL (ref 8.9–12.9)
POTASSIUM SERPL-SCNC: 4.2 MMOL/L (ref 3.5–5.1)
PROCALCITONIN SERPL-MCNC: <0.05 NG/ML
PROTHROMBIN TIME: 13.2 SEC (ref 9–11.1)
RBC # BLD AUTO: 2.4 M/UL (ref 3.8–5.2)
SODIUM SERPL-SCNC: 135 MMOL/L (ref 136–145)
URATE SERPL-MCNC: 6.9 MG/DL (ref 2.6–6)
VAS LEFT ABI: 1.01
VAS LEFT ARM BP: 98 MMHG
VAS LEFT DORSALIS PEDIS BP: 92 MMHG
VAS LEFT PTA BP: 99 MMHG
VAS LEFT TBI: 0.45
VAS LEFT TOE PRESSURE: 44 MMHG
VAS RIGHT ABI: 1.11
VAS RIGHT DORSALIS PEDIS BP: 98 MMHG
VAS RIGHT PTA BP: 109 MMHG
WBC # BLD AUTO: 9.3 K/UL (ref 3.6–11)

## 2023-05-20 PROCEDURE — 84550 ASSAY OF BLOOD/URIC ACID: CPT

## 2023-05-20 PROCEDURE — 85027 COMPLETE CBC AUTOMATED: CPT

## 2023-05-20 PROCEDURE — 2580000003 HC RX 258: Performed by: INTERNAL MEDICINE

## 2023-05-20 PROCEDURE — 85610 PROTHROMBIN TIME: CPT

## 2023-05-20 PROCEDURE — 80048 BASIC METABOLIC PNL TOTAL CA: CPT

## 2023-05-20 PROCEDURE — 36415 COLL VENOUS BLD VENIPUNCTURE: CPT

## 2023-05-20 PROCEDURE — 6370000000 HC RX 637 (ALT 250 FOR IP): Performed by: INTERNAL MEDICINE

## 2023-05-20 PROCEDURE — 6360000002 HC RX W HCPCS: Performed by: INTERNAL MEDICINE

## 2023-05-20 PROCEDURE — 2060000000 HC ICU INTERMEDIATE R&B

## 2023-05-20 PROCEDURE — 84145 PROCALCITONIN (PCT): CPT

## 2023-05-20 PROCEDURE — 6370000000 HC RX 637 (ALT 250 FOR IP)

## 2023-05-20 PROCEDURE — 51798 US URINE CAPACITY MEASURE: CPT

## 2023-05-20 PROCEDURE — 85652 RBC SED RATE AUTOMATED: CPT

## 2023-05-20 PROCEDURE — 6360000002 HC RX W HCPCS: Performed by: STUDENT IN AN ORGANIZED HEALTH CARE EDUCATION/TRAINING PROGRAM

## 2023-05-20 RX ORDER — WARFARIN SODIUM 5 MG/1
10 TABLET ORAL
Status: COMPLETED | OUTPATIENT
Start: 2023-05-20 | End: 2023-05-20

## 2023-05-20 RX ORDER — TRAMADOL HYDROCHLORIDE 50 MG/1
50 TABLET ORAL EVERY 6 HOURS PRN
Status: DISCONTINUED | OUTPATIENT
Start: 2023-05-20 | End: 2023-06-02 | Stop reason: HOSPADM

## 2023-05-20 RX ORDER — PANTOPRAZOLE SODIUM 40 MG/1
40 TABLET, DELAYED RELEASE ORAL
Status: DISCONTINUED | OUTPATIENT
Start: 2023-05-20 | End: 2023-06-02 | Stop reason: HOSPADM

## 2023-05-20 RX ADMIN — PANTOPRAZOLE SODIUM 40 MG: 40 TABLET, DELAYED RELEASE ORAL at 06:42

## 2023-05-20 RX ADMIN — TRAMADOL HYDROCHLORIDE 50 MG: 50 TABLET ORAL at 16:01

## 2023-05-20 RX ADMIN — WARFARIN SODIUM 10 MG: 5 TABLET ORAL at 17:40

## 2023-05-20 RX ADMIN — SACUBITRIL AND VALSARTAN 1 TABLET: 49; 51 TABLET, FILM COATED ORAL at 11:34

## 2023-05-20 RX ADMIN — CEFTRIAXONE SODIUM 1000 MG: 1 INJECTION, POWDER, FOR SOLUTION INTRAMUSCULAR; INTRAVENOUS at 11:34

## 2023-05-20 RX ADMIN — KETOROLAC TROMETHAMINE 15 MG: 30 INJECTION, SOLUTION INTRAMUSCULAR; INTRAVENOUS at 11:45

## 2023-05-20 RX ADMIN — KETOROLAC TROMETHAMINE 15 MG: 30 INJECTION, SOLUTION INTRAMUSCULAR; INTRAVENOUS at 17:40

## 2023-05-20 RX ADMIN — KETOROLAC TROMETHAMINE 15 MG: 30 INJECTION, SOLUTION INTRAMUSCULAR; INTRAVENOUS at 01:16

## 2023-05-20 ASSESSMENT — PAIN DESCRIPTION - FREQUENCY
FREQUENCY: INTERMITTENT
FREQUENCY: INTERMITTENT

## 2023-05-20 ASSESSMENT — PAIN SCALES - GENERAL
PAINLEVEL_OUTOF10: 6
PAINLEVEL_OUTOF10: 6
PAINLEVEL_OUTOF10: 3
PAINLEVEL_OUTOF10: 2
PAINLEVEL_OUTOF10: 3
PAINLEVEL_OUTOF10: 5
PAINLEVEL_OUTOF10: 6

## 2023-05-20 ASSESSMENT — PAIN DESCRIPTION - DESCRIPTORS
DESCRIPTORS: CRAMPING;ACHING
DESCRIPTORS: ACHING;CRAMPING

## 2023-05-20 ASSESSMENT — PAIN DESCRIPTION - ONSET
ONSET: GRADUAL
ONSET: GRADUAL

## 2023-05-20 ASSESSMENT — PAIN DESCRIPTION - ORIENTATION
ORIENTATION: LEFT

## 2023-05-20 ASSESSMENT — PAIN DESCRIPTION - LOCATION
LOCATION: ANKLE;FOOT
LOCATION: LEG
LOCATION: FOOT
LOCATION: FOOT
LOCATION: LEG

## 2023-05-20 ASSESSMENT — PAIN - FUNCTIONAL ASSESSMENT: PAIN_FUNCTIONAL_ASSESSMENT: PREVENTS OR INTERFERES SOME ACTIVE ACTIVITIES AND ADLS

## 2023-05-21 LAB
BASOPHILS # BLD: 0 K/UL (ref 0–0.1)
BASOPHILS NFR BLD: 0 % (ref 0–1)
DIFFERENTIAL METHOD BLD: ABNORMAL
EOSINOPHIL # BLD: 0.1 K/UL (ref 0–0.4)
EOSINOPHIL NFR BLD: 2 % (ref 0–7)
ERYTHROCYTE [DISTWIDTH] IN BLOOD BY AUTOMATED COUNT: 16 % (ref 11.5–14.5)
HCT VFR BLD AUTO: 24.7 % (ref 35–47)
HGB BLD-MCNC: 7.8 G/DL (ref 11.5–16)
IMM GRANULOCYTES # BLD AUTO: 0 K/UL (ref 0–0.04)
IMM GRANULOCYTES NFR BLD AUTO: 0 % (ref 0–0.5)
INR PPP: 1.3 (ref 0.9–1.1)
LYMPHOCYTES # BLD: 1.4 K/UL (ref 0.8–3.5)
LYMPHOCYTES NFR BLD: 26 % (ref 12–49)
MCH RBC QN AUTO: 29.9 PG (ref 26–34)
MCHC RBC AUTO-ENTMCNC: 31.6 G/DL (ref 30–36.5)
MCV RBC AUTO: 94.6 FL (ref 80–99)
MONOCYTES # BLD: 0.5 K/UL (ref 0–1)
MONOCYTES NFR BLD: 10 % (ref 5–13)
NEUTS SEG # BLD: 3.2 K/UL (ref 1.8–8)
NEUTS SEG NFR BLD: 62 % (ref 32–75)
NRBC # BLD: 0 K/UL (ref 0–0.01)
NRBC BLD-RTO: 0 PER 100 WBC
PLATELET # BLD AUTO: 284 K/UL (ref 150–400)
PMV BLD AUTO: 9.5 FL (ref 8.9–12.9)
PROTHROMBIN TIME: 13.8 SEC (ref 9–11.1)
RBC # BLD AUTO: 2.61 M/UL (ref 3.8–5.2)
WBC # BLD AUTO: 5.2 K/UL (ref 3.6–11)

## 2023-05-21 PROCEDURE — 1100000000 HC RM PRIVATE

## 2023-05-21 PROCEDURE — 6370000000 HC RX 637 (ALT 250 FOR IP): Performed by: INTERNAL MEDICINE

## 2023-05-21 PROCEDURE — 6360000002 HC RX W HCPCS: Performed by: INTERNAL MEDICINE

## 2023-05-21 PROCEDURE — 6370000000 HC RX 637 (ALT 250 FOR IP)

## 2023-05-21 PROCEDURE — 2580000003 HC RX 258: Performed by: INTERNAL MEDICINE

## 2023-05-21 PROCEDURE — 85610 PROTHROMBIN TIME: CPT

## 2023-05-21 PROCEDURE — 36415 COLL VENOUS BLD VENIPUNCTURE: CPT

## 2023-05-21 PROCEDURE — 2060000000 HC ICU INTERMEDIATE R&B

## 2023-05-21 PROCEDURE — 85025 COMPLETE CBC W/AUTO DIFF WBC: CPT

## 2023-05-21 RX ORDER — WARFARIN SODIUM 5 MG/1
10 TABLET ORAL
Status: COMPLETED | OUTPATIENT
Start: 2023-05-21 | End: 2023-05-21

## 2023-05-21 RX ADMIN — PANTOPRAZOLE SODIUM 40 MG: 40 TABLET, DELAYED RELEASE ORAL at 07:33

## 2023-05-21 RX ADMIN — CEFTRIAXONE SODIUM 1000 MG: 1 INJECTION, POWDER, FOR SOLUTION INTRAMUSCULAR; INTRAVENOUS at 10:29

## 2023-05-21 RX ADMIN — TRAMADOL HYDROCHLORIDE 50 MG: 50 TABLET ORAL at 15:53

## 2023-05-21 RX ADMIN — SACUBITRIL AND VALSARTAN 1 TABLET: 49; 51 TABLET, FILM COATED ORAL at 20:50

## 2023-05-21 RX ADMIN — SACUBITRIL AND VALSARTAN 1 TABLET: 49; 51 TABLET, FILM COATED ORAL at 10:27

## 2023-05-21 RX ADMIN — WARFARIN SODIUM 10 MG: 5 TABLET ORAL at 17:39

## 2023-05-21 ASSESSMENT — PAIN DESCRIPTION - ORIENTATION
ORIENTATION: LEFT
ORIENTATION: LEFT

## 2023-05-21 ASSESSMENT — PAIN DESCRIPTION - LOCATION
LOCATION: LEG
LOCATION: LEG

## 2023-05-21 ASSESSMENT — PAIN DESCRIPTION - DESCRIPTORS
DESCRIPTORS: ACHING;DISCOMFORT
DESCRIPTORS: ACHING;DISCOMFORT

## 2023-05-21 ASSESSMENT — PAIN SCALES - GENERAL
PAINLEVEL_OUTOF10: 4
PAINLEVEL_OUTOF10: 3

## 2023-05-22 ENCOUNTER — APPOINTMENT (OUTPATIENT)
Facility: HOSPITAL | Age: 63
End: 2023-05-22
Payer: COMMERCIAL

## 2023-05-22 LAB
COMMENT:: NORMAL
ERYTHROCYTE [DISTWIDTH] IN BLOOD BY AUTOMATED COUNT: 16.4 % (ref 11.5–14.5)
FERRITIN SERPL-MCNC: 88 NG/ML (ref 26–388)
FOLATE SERPL-MCNC: 4.8 NG/ML (ref 5–21)
HCT VFR BLD AUTO: 21.9 % (ref 35–47)
HCT VFR BLD AUTO: 22.3 % (ref 35–47)
HGB BLD-MCNC: 6.7 G/DL (ref 11.5–16)
HGB BLD-MCNC: 7.1 G/DL (ref 11.5–16)
INR PPP: 1.5 (ref 0.9–1.1)
IRON SATN MFR SERPL: 19 % (ref 20–50)
IRON SERPL-MCNC: 49 UG/DL (ref 35–150)
MCH RBC QN AUTO: 29.4 PG (ref 26–34)
MCHC RBC AUTO-ENTMCNC: 30.6 G/DL (ref 30–36.5)
MCV RBC AUTO: 96.1 FL (ref 80–99)
NRBC # BLD: 0 K/UL (ref 0–0.01)
NRBC BLD-RTO: 0 PER 100 WBC
PLATELET # BLD AUTO: 265 K/UL (ref 150–400)
PMV BLD AUTO: 9.3 FL (ref 8.9–12.9)
PROTHROMBIN TIME: 15.4 SEC (ref 9–11.1)
RBC # BLD AUTO: 2.28 M/UL (ref 3.8–5.2)
SPECIMEN HOLD: NORMAL
TIBC SERPL-MCNC: 255 UG/DL (ref 250–450)
TIBC SERPL-MCNC: 258 UG/DL (ref 250–450)
VIT B12 SERPL-MCNC: 608 PG/ML (ref 193–986)
WBC # BLD AUTO: 4.6 K/UL (ref 3.6–11)

## 2023-05-22 PROCEDURE — 86900 BLOOD TYPING SEROLOGIC ABO: CPT

## 2023-05-22 PROCEDURE — 85027 COMPLETE CBC AUTOMATED: CPT

## 2023-05-22 PROCEDURE — 36415 COLL VENOUS BLD VENIPUNCTURE: CPT

## 2023-05-22 PROCEDURE — 6360000002 HC RX W HCPCS: Performed by: INTERNAL MEDICINE

## 2023-05-22 PROCEDURE — 85018 HEMOGLOBIN: CPT

## 2023-05-22 PROCEDURE — 6360000004 HC RX CONTRAST MEDICATION: Performed by: INTERNAL MEDICINE

## 2023-05-22 PROCEDURE — 83550 IRON BINDING TEST: CPT

## 2023-05-22 PROCEDURE — 86850 RBC ANTIBODY SCREEN: CPT

## 2023-05-22 PROCEDURE — 83540 ASSAY OF IRON: CPT

## 2023-05-22 PROCEDURE — 82607 VITAMIN B-12: CPT

## 2023-05-22 PROCEDURE — 73701 CT LOWER EXTREMITY W/DYE: CPT

## 2023-05-22 PROCEDURE — 6370000000 HC RX 637 (ALT 250 FOR IP): Performed by: INTERNAL MEDICINE

## 2023-05-22 PROCEDURE — 86901 BLOOD TYPING SEROLOGIC RH(D): CPT

## 2023-05-22 PROCEDURE — 1100000000 HC RM PRIVATE

## 2023-05-22 PROCEDURE — 6370000000 HC RX 637 (ALT 250 FOR IP)

## 2023-05-22 PROCEDURE — 85610 PROTHROMBIN TIME: CPT

## 2023-05-22 PROCEDURE — 86923 COMPATIBILITY TEST ELECTRIC: CPT

## 2023-05-22 PROCEDURE — 82746 ASSAY OF FOLIC ACID SERUM: CPT

## 2023-05-22 PROCEDURE — 85014 HEMATOCRIT: CPT

## 2023-05-22 PROCEDURE — 82728 ASSAY OF FERRITIN: CPT

## 2023-05-22 PROCEDURE — 2580000003 HC RX 258: Performed by: INTERNAL MEDICINE

## 2023-05-22 RX ORDER — DIPHENHYDRAMINE HYDROCHLORIDE 50 MG/ML
50 INJECTION INTRAMUSCULAR; INTRAVENOUS ONCE
Status: COMPLETED | OUTPATIENT
Start: 2023-05-22 | End: 2023-05-22

## 2023-05-22 RX ORDER — WARFARIN SODIUM 7.5 MG/1
15 TABLET ORAL
Status: COMPLETED | OUTPATIENT
Start: 2023-05-22 | End: 2023-05-22

## 2023-05-22 RX ORDER — SODIUM CHLORIDE 9 MG/ML
INJECTION, SOLUTION INTRAVENOUS PRN
Status: DISCONTINUED | OUTPATIENT
Start: 2023-05-22 | End: 2023-05-22 | Stop reason: ALTCHOICE

## 2023-05-22 RX ORDER — FOLIC ACID 1 MG/1
1 TABLET ORAL DAILY
Status: DISCONTINUED | OUTPATIENT
Start: 2023-05-22 | End: 2023-06-02 | Stop reason: HOSPADM

## 2023-05-22 RX ORDER — WARFARIN SODIUM 7.5 MG/1
15 TABLET ORAL
Status: DISCONTINUED | OUTPATIENT
Start: 2023-05-22 | End: 2023-05-22

## 2023-05-22 RX ORDER — CASTOR OIL AND BALSAM, PERU 788; 87 MG/G; MG/G
OINTMENT TOPICAL 2 TIMES DAILY
Status: DISCONTINUED | OUTPATIENT
Start: 2023-05-22 | End: 2023-06-02 | Stop reason: HOSPADM

## 2023-05-22 RX ORDER — POLYETHYLENE GLYCOL 3350 17 G/17G
17 POWDER, FOR SOLUTION ORAL DAILY
Status: DISCONTINUED | OUTPATIENT
Start: 2023-05-22 | End: 2023-06-02 | Stop reason: HOSPADM

## 2023-05-22 RX ADMIN — POLYETHYLENE GLYCOL 3350 17 G: 17 POWDER, FOR SOLUTION ORAL at 11:42

## 2023-05-22 RX ADMIN — ATORVASTATIN CALCIUM 40 MG: 40 TABLET, FILM COATED ORAL at 21:08

## 2023-05-22 RX ADMIN — DIPHENHYDRAMINE HYDROCHLORIDE 50 MG: 50 INJECTION, SOLUTION INTRAMUSCULAR; INTRAVENOUS at 13:33

## 2023-05-22 RX ADMIN — TRAMADOL HYDROCHLORIDE 50 MG: 50 TABLET ORAL at 11:32

## 2023-05-22 RX ADMIN — IRON SUCROSE 300 MG: 20 INJECTION, SOLUTION INTRAVENOUS at 16:10

## 2023-05-22 RX ADMIN — PANTOPRAZOLE SODIUM 40 MG: 40 TABLET, DELAYED RELEASE ORAL at 06:23

## 2023-05-22 RX ADMIN — TRAMADOL HYDROCHLORIDE 50 MG: 50 TABLET ORAL at 21:33

## 2023-05-22 RX ADMIN — IOPAMIDOL 100 ML: 755 INJECTION, SOLUTION INTRAVENOUS at 15:54

## 2023-05-22 RX ADMIN — CEFTRIAXONE SODIUM 1000 MG: 1 INJECTION, POWDER, FOR SOLUTION INTRAMUSCULAR; INTRAVENOUS at 09:09

## 2023-05-22 RX ADMIN — CASTOR OIL AND BALSAM, PERU: 788; 87 OINTMENT TOPICAL at 21:11

## 2023-05-22 RX ADMIN — SACUBITRIL AND VALSARTAN 1 TABLET: 49; 51 TABLET, FILM COATED ORAL at 21:08

## 2023-05-22 RX ADMIN — WARFARIN SODIUM 15 MG: 7.5 TABLET ORAL at 21:10

## 2023-05-22 ASSESSMENT — PAIN SCALES - GENERAL
PAINLEVEL_OUTOF10: 0
PAINLEVEL_OUTOF10: 6
PAINLEVEL_OUTOF10: 6
PAINLEVEL_OUTOF10: 0
PAINLEVEL_OUTOF10: 0

## 2023-05-22 ASSESSMENT — PAIN DESCRIPTION - ORIENTATION: ORIENTATION: ANTERIOR

## 2023-05-22 ASSESSMENT — PAIN DESCRIPTION - LOCATION
LOCATION: LEG
LOCATION: HEAD

## 2023-05-22 ASSESSMENT — PAIN - FUNCTIONAL ASSESSMENT: PAIN_FUNCTIONAL_ASSESSMENT: ACTIVITIES ARE NOT PREVENTED

## 2023-05-22 ASSESSMENT — PAIN DESCRIPTION - DESCRIPTORS: DESCRIPTORS: ACHING

## 2023-05-23 LAB
ANION GAP SERPL CALC-SCNC: 2 MMOL/L (ref 5–15)
BASOPHILS # BLD: 0 K/UL (ref 0–0.1)
BASOPHILS NFR BLD: 0 % (ref 0–1)
BUN SERPL-MCNC: 11 MG/DL (ref 6–20)
BUN/CREAT SERPL: 16 (ref 12–20)
CALCIUM SERPL-MCNC: 9.1 MG/DL (ref 8.5–10.1)
CHLORIDE SERPL-SCNC: 105 MMOL/L (ref 97–108)
CO2 SERPL-SCNC: 25 MMOL/L (ref 21–32)
CREAT SERPL-MCNC: 0.68 MG/DL (ref 0.55–1.02)
DIFFERENTIAL METHOD BLD: ABNORMAL
EOSINOPHIL # BLD: 0.2 K/UL (ref 0–0.4)
EOSINOPHIL NFR BLD: 3 % (ref 0–7)
ERYTHROCYTE [DISTWIDTH] IN BLOOD BY AUTOMATED COUNT: 16.8 % (ref 11.5–14.5)
GLUCOSE SERPL-MCNC: 84 MG/DL (ref 65–100)
HCT VFR BLD AUTO: 21.6 % (ref 35–47)
HGB BLD-MCNC: 6.9 G/DL (ref 11.5–16)
HISTORY CHECK: NORMAL
IMM GRANULOCYTES # BLD AUTO: 0.1 K/UL (ref 0–0.04)
IMM GRANULOCYTES NFR BLD AUTO: 1 % (ref 0–0.5)
INR PPP: 1.4 (ref 0.9–1.1)
LYMPHOCYTES # BLD: 0.9 K/UL (ref 0.8–3.5)
LYMPHOCYTES NFR BLD: 17 % (ref 12–49)
MCH RBC QN AUTO: 31.5 PG (ref 26–34)
MCHC RBC AUTO-ENTMCNC: 31.9 G/DL (ref 30–36.5)
MCV RBC AUTO: 98.6 FL (ref 80–99)
MONOCYTES # BLD: 0.6 K/UL (ref 0–1)
MONOCYTES NFR BLD: 10 % (ref 5–13)
NEUTS SEG # BLD: 3.7 K/UL (ref 1.8–8)
NEUTS SEG NFR BLD: 69 % (ref 32–75)
NRBC # BLD: 0 K/UL (ref 0–0.01)
NRBC BLD-RTO: 0 PER 100 WBC
PLATELET # BLD AUTO: 286 K/UL (ref 150–400)
PMV BLD AUTO: 10 FL (ref 8.9–12.9)
POTASSIUM SERPL-SCNC: 3.9 MMOL/L (ref 3.5–5.1)
PROTHROMBIN TIME: 14.8 SEC (ref 9–11.1)
RBC # BLD AUTO: 2.19 M/UL (ref 3.8–5.2)
RBC MORPH BLD: ABNORMAL
SODIUM SERPL-SCNC: 132 MMOL/L (ref 136–145)
WBC # BLD AUTO: 5.5 K/UL (ref 3.6–11)

## 2023-05-23 PROCEDURE — 2580000003 HC RX 258: Performed by: INTERNAL MEDICINE

## 2023-05-23 PROCEDURE — 1100000000 HC RM PRIVATE

## 2023-05-23 PROCEDURE — 6360000002 HC RX W HCPCS: Performed by: INTERNAL MEDICINE

## 2023-05-23 PROCEDURE — 36415 COLL VENOUS BLD VENIPUNCTURE: CPT

## 2023-05-23 PROCEDURE — 85025 COMPLETE CBC W/AUTO DIFF WBC: CPT

## 2023-05-23 PROCEDURE — 6370000000 HC RX 637 (ALT 250 FOR IP): Performed by: INTERNAL MEDICINE

## 2023-05-23 PROCEDURE — 85610 PROTHROMBIN TIME: CPT

## 2023-05-23 PROCEDURE — 80048 BASIC METABOLIC PNL TOTAL CA: CPT

## 2023-05-23 PROCEDURE — 6370000000 HC RX 637 (ALT 250 FOR IP)

## 2023-05-23 RX ORDER — SODIUM CHLORIDE 9 MG/ML
INJECTION, SOLUTION INTRAVENOUS PRN
Status: DISCONTINUED | OUTPATIENT
Start: 2023-05-23 | End: 2023-05-27

## 2023-05-23 RX ORDER — AMMONIUM LACTATE 12 G/100G
LOTION TOPICAL 2 TIMES DAILY
Status: DISCONTINUED | OUTPATIENT
Start: 2023-05-23 | End: 2023-06-02 | Stop reason: HOSPADM

## 2023-05-23 RX ORDER — WARFARIN SODIUM 7.5 MG/1
15 TABLET ORAL
Status: DISCONTINUED | OUTPATIENT
Start: 2023-05-23 | End: 2023-05-23

## 2023-05-23 RX ADMIN — SACUBITRIL AND VALSARTAN 1 TABLET: 49; 51 TABLET, FILM COATED ORAL at 20:04

## 2023-05-23 RX ADMIN — PANTOPRAZOLE SODIUM 40 MG: 40 TABLET, DELAYED RELEASE ORAL at 05:10

## 2023-05-23 RX ADMIN — IRON SUCROSE 300 MG: 20 INJECTION, SOLUTION INTRAVENOUS at 20:50

## 2023-05-23 RX ADMIN — TRAMADOL HYDROCHLORIDE 50 MG: 50 TABLET ORAL at 20:04

## 2023-05-23 RX ADMIN — CASTOR OIL AND BALSAM, PERU: 788; 87 OINTMENT TOPICAL at 20:04

## 2023-05-23 RX ADMIN — Medication: at 21:34

## 2023-05-23 RX ADMIN — CASTOR OIL AND BALSAM, PERU: 788; 87 OINTMENT TOPICAL at 10:00

## 2023-05-23 RX ADMIN — CEFTRIAXONE SODIUM 1000 MG: 1 INJECTION, POWDER, FOR SOLUTION INTRAMUSCULAR; INTRAVENOUS at 08:47

## 2023-05-23 RX ADMIN — SACUBITRIL AND VALSARTAN 1 TABLET: 49; 51 TABLET, FILM COATED ORAL at 08:47

## 2023-05-23 ASSESSMENT — PAIN DESCRIPTION - ORIENTATION
ORIENTATION: ANTERIOR
ORIENTATION: ANTERIOR;LEFT

## 2023-05-23 ASSESSMENT — PAIN SCALES - GENERAL
PAINLEVEL_OUTOF10: 4
PAINLEVEL_OUTOF10: 7

## 2023-05-23 ASSESSMENT — PAIN DESCRIPTION - DESCRIPTORS
DESCRIPTORS: ACHING
DESCRIPTORS: ACHING

## 2023-05-23 ASSESSMENT — PAIN DESCRIPTION - LOCATION
LOCATION: LEG
LOCATION: LEG

## 2023-05-24 LAB
ERYTHROCYTE [DISTWIDTH] IN BLOOD BY AUTOMATED COUNT: 16.7 % (ref 11.5–14.5)
HCT VFR BLD AUTO: 19.7 % (ref 35–47)
HCT VFR BLD AUTO: 25.6 % (ref 35–47)
HCT VFR BLD AUTO: 26.8 % (ref 35–47)
HGB BLD-MCNC: 6.1 G/DL (ref 11.5–16)
HGB BLD-MCNC: 8.1 G/DL (ref 11.5–16)
HGB BLD-MCNC: 8.3 G/DL (ref 11.5–16)
INR PPP: 1.5 (ref 0.9–1.1)
MCH RBC QN AUTO: 29.9 PG (ref 26–34)
MCHC RBC AUTO-ENTMCNC: 31 G/DL (ref 30–36.5)
MCV RBC AUTO: 96.6 FL (ref 80–99)
NRBC # BLD: 0 K/UL (ref 0–0.01)
NRBC BLD-RTO: 0 PER 100 WBC
PLATELET # BLD AUTO: 263 K/UL (ref 150–400)
PMV BLD AUTO: 9.7 FL (ref 8.9–12.9)
PROTHROMBIN TIME: 15.1 SEC (ref 9–11.1)
RBC # BLD AUTO: 2.04 M/UL (ref 3.8–5.2)
WBC # BLD AUTO: 5.4 K/UL (ref 3.6–11)

## 2023-05-24 PROCEDURE — 36415 COLL VENOUS BLD VENIPUNCTURE: CPT

## 2023-05-24 PROCEDURE — 6370000000 HC RX 637 (ALT 250 FOR IP): Performed by: INTERNAL MEDICINE

## 2023-05-24 PROCEDURE — 1100000000 HC RM PRIVATE

## 2023-05-24 PROCEDURE — 85014 HEMATOCRIT: CPT

## 2023-05-24 PROCEDURE — 85027 COMPLETE CBC AUTOMATED: CPT

## 2023-05-24 PROCEDURE — 97530 THERAPEUTIC ACTIVITIES: CPT

## 2023-05-24 PROCEDURE — P9016 RBC LEUKOCYTES REDUCED: HCPCS

## 2023-05-24 PROCEDURE — 97116 GAIT TRAINING THERAPY: CPT

## 2023-05-24 PROCEDURE — 6370000000 HC RX 637 (ALT 250 FOR IP)

## 2023-05-24 PROCEDURE — 85610 PROTHROMBIN TIME: CPT

## 2023-05-24 PROCEDURE — 2580000003 HC RX 258: Performed by: INTERNAL MEDICINE

## 2023-05-24 PROCEDURE — 36430 TRANSFUSION BLD/BLD COMPNT: CPT

## 2023-05-24 PROCEDURE — 6360000002 HC RX W HCPCS: Performed by: INTERNAL MEDICINE

## 2023-05-24 PROCEDURE — 85018 HEMOGLOBIN: CPT

## 2023-05-24 RX ADMIN — CASTOR OIL AND BALSAM, PERU: 788; 87 OINTMENT TOPICAL at 09:11

## 2023-05-24 RX ADMIN — CEFTRIAXONE SODIUM 1000 MG: 1 INJECTION, POWDER, FOR SOLUTION INTRAMUSCULAR; INTRAVENOUS at 09:11

## 2023-05-24 RX ADMIN — TRAMADOL HYDROCHLORIDE 50 MG: 50 TABLET ORAL at 20:22

## 2023-05-24 RX ADMIN — SACUBITRIL AND VALSARTAN 1 TABLET: 49; 51 TABLET, FILM COATED ORAL at 08:39

## 2023-05-24 RX ADMIN — Medication: at 09:11

## 2023-05-24 RX ADMIN — IRON SUCROSE 300 MG: 20 INJECTION, SOLUTION INTRAVENOUS at 15:27

## 2023-05-24 RX ADMIN — SACUBITRIL AND VALSARTAN 1 TABLET: 49; 51 TABLET, FILM COATED ORAL at 20:23

## 2023-05-24 RX ADMIN — PANTOPRAZOLE SODIUM 40 MG: 40 TABLET, DELAYED RELEASE ORAL at 05:21

## 2023-05-24 RX ADMIN — CASTOR OIL AND BALSAM, PERU: 788; 87 OINTMENT TOPICAL at 20:27

## 2023-05-24 RX ADMIN — Medication: at 20:27

## 2023-05-24 RX ADMIN — POLYETHYLENE GLYCOL 3350 17 G: 17 POWDER, FOR SOLUTION ORAL at 08:39

## 2023-05-24 ASSESSMENT — PAIN DESCRIPTION - ORIENTATION: ORIENTATION: RIGHT;LEFT

## 2023-05-24 ASSESSMENT — PAIN - FUNCTIONAL ASSESSMENT: PAIN_FUNCTIONAL_ASSESSMENT: ACTIVITIES ARE NOT PREVENTED

## 2023-05-24 ASSESSMENT — PAIN SCALES - GENERAL
PAINLEVEL_OUTOF10: 3
PAINLEVEL_OUTOF10: 5

## 2023-05-24 ASSESSMENT — PAIN DESCRIPTION - LOCATION: LOCATION: LEG

## 2023-05-24 ASSESSMENT — PAIN DESCRIPTION - DESCRIPTORS: DESCRIPTORS: ACHING;DISCOMFORT

## 2023-05-25 ENCOUNTER — APPOINTMENT (OUTPATIENT)
Facility: HOSPITAL | Age: 63
End: 2023-05-25
Payer: COMMERCIAL

## 2023-05-25 LAB
ANION GAP SERPL CALC-SCNC: 5 MMOL/L (ref 5–15)
BUN SERPL-MCNC: 10 MG/DL (ref 6–20)
BUN/CREAT SERPL: 13 (ref 12–20)
CALCIUM SERPL-MCNC: 8.4 MG/DL (ref 8.5–10.1)
CHLORIDE SERPL-SCNC: 109 MMOL/L (ref 97–108)
CO2 SERPL-SCNC: 24 MMOL/L (ref 21–32)
CREAT SERPL-MCNC: 0.75 MG/DL (ref 0.55–1.02)
GLUCOSE SERPL-MCNC: 86 MG/DL (ref 65–100)
HAPTOGLOB SERPL-MCNC: 380 MG/DL (ref 30–200)
HCT VFR BLD AUTO: 17.4 % (ref 35–47)
HCT VFR BLD AUTO: 23.1 % (ref 35–47)
HCT VFR BLD AUTO: 29.2 % (ref 35–47)
HGB BLD-MCNC: 5.1 G/DL (ref 11.5–16)
HGB BLD-MCNC: 7.5 G/DL (ref 11.5–16)
HGB BLD-MCNC: 9.4 G/DL (ref 11.5–16)
HISTORY CHECK: NORMAL
INR PPP: 1.3 (ref 0.9–1.1)
LDH SERPL L TO P-CCNC: 185 U/L (ref 81–246)
POTASSIUM SERPL-SCNC: 3.6 MMOL/L (ref 3.5–5.1)
PROTHROMBIN TIME: 12.9 SEC (ref 9–11.1)
RETICS # AUTO: 0.15 M/UL (ref 0.02–0.08)
RETICS/RBC NFR AUTO: 4.9 % (ref 0.7–2.1)
SODIUM SERPL-SCNC: 138 MMOL/L (ref 136–145)

## 2023-05-25 PROCEDURE — 83010 ASSAY OF HAPTOGLOBIN QUANT: CPT

## 2023-05-25 PROCEDURE — 85045 AUTOMATED RETICULOCYTE COUNT: CPT

## 2023-05-25 PROCEDURE — 85018 HEMOGLOBIN: CPT

## 2023-05-25 PROCEDURE — P9016 RBC LEUKOCYTES REDUCED: HCPCS

## 2023-05-25 PROCEDURE — 6370000000 HC RX 637 (ALT 250 FOR IP)

## 2023-05-25 PROCEDURE — 36430 TRANSFUSION BLD/BLD COMPNT: CPT

## 2023-05-25 PROCEDURE — 85610 PROTHROMBIN TIME: CPT

## 2023-05-25 PROCEDURE — 80048 BASIC METABOLIC PNL TOTAL CA: CPT

## 2023-05-25 PROCEDURE — 1100000000 HC RM PRIVATE

## 2023-05-25 PROCEDURE — 85014 HEMATOCRIT: CPT

## 2023-05-25 PROCEDURE — 36415 COLL VENOUS BLD VENIPUNCTURE: CPT

## 2023-05-25 PROCEDURE — 74176 CT ABD & PELVIS W/O CONTRAST: CPT

## 2023-05-25 PROCEDURE — 6370000000 HC RX 637 (ALT 250 FOR IP): Performed by: NURSE PRACTITIONER

## 2023-05-25 PROCEDURE — 83615 LACTATE (LD) (LDH) ENZYME: CPT

## 2023-05-25 PROCEDURE — 6370000000 HC RX 637 (ALT 250 FOR IP): Performed by: INTERNAL MEDICINE

## 2023-05-25 RX ORDER — SODIUM CHLORIDE 9 MG/ML
INJECTION, SOLUTION INTRAVENOUS PRN
Status: COMPLETED | OUTPATIENT
Start: 2023-05-25 | End: 2023-05-26

## 2023-05-25 RX ADMIN — Medication: at 09:12

## 2023-05-25 RX ADMIN — CASTOR OIL AND BALSAM, PERU: 788; 87 OINTMENT TOPICAL at 09:12

## 2023-05-25 RX ADMIN — SACUBITRIL AND VALSARTAN 1 TABLET: 49; 51 TABLET, FILM COATED ORAL at 21:10

## 2023-05-25 RX ADMIN — POLYETHYLENE GLYCOL 3350 17 G: 17 POWDER, FOR SOLUTION ORAL at 09:11

## 2023-05-25 RX ADMIN — POLYETHYLENE GLYCOL-3350 AND ELECTROLYTES 2000 ML: 236; 6.74; 5.86; 2.97; 22.74 POWDER, FOR SOLUTION ORAL at 18:25

## 2023-05-25 RX ADMIN — PANTOPRAZOLE SODIUM 40 MG: 40 TABLET, DELAYED RELEASE ORAL at 06:43

## 2023-05-25 RX ADMIN — POLYETHYLENE GLYCOL-3350 AND ELECTROLYTES 2000 ML: 236; 6.74; 5.86; 2.97; 22.74 POWDER, FOR SOLUTION ORAL at 11:25

## 2023-05-25 RX ADMIN — SACUBITRIL AND VALSARTAN 1 TABLET: 49; 51 TABLET, FILM COATED ORAL at 09:11

## 2023-05-25 RX ADMIN — Medication: at 21:10

## 2023-05-25 RX ADMIN — CASTOR OIL AND BALSAM, PERU: 788; 87 OINTMENT TOPICAL at 21:11

## 2023-05-25 ASSESSMENT — PAIN SCALES - GENERAL: PAINLEVEL_OUTOF10: 2

## 2023-05-26 ENCOUNTER — ANESTHESIA EVENT (OUTPATIENT)
Facility: HOSPITAL | Age: 63
End: 2023-05-26
Payer: COMMERCIAL

## 2023-05-26 ENCOUNTER — APPOINTMENT (OUTPATIENT)
Facility: HOSPITAL | Age: 63
End: 2023-05-26
Attending: INTERNAL MEDICINE
Payer: COMMERCIAL

## 2023-05-26 ENCOUNTER — ANESTHESIA (OUTPATIENT)
Facility: HOSPITAL | Age: 63
End: 2023-05-26
Payer: COMMERCIAL

## 2023-05-26 LAB
ABO + RH BLD: NORMAL
BLD PROD TYP BPU: NORMAL
BLOOD BANK DISPENSE STATUS: NORMAL
BLOOD GROUP ANTIBODIES SERPL: NORMAL
BPU ID: NORMAL
CROSSMATCH RESULT: NORMAL
ECHO AO ROOT DIAM: 3.1 CM
ECHO AO ROOT INDEX: 1.87 CM/M2
ECHO AV AREA PEAK VELOCITY: 2.7 CM2
ECHO AV AREA VTI: 2.5 CM2
ECHO AV AREA/BSA PEAK VELOCITY: 1.6 CM2/M2
ECHO AV AREA/BSA VTI: 1.5 CM2/M2
ECHO AV MEAN GRADIENT: 5 MMHG
ECHO AV MEAN VELOCITY: 1.1 M/S
ECHO AV PEAK GRADIENT: 11 MMHG
ECHO AV PEAK VELOCITY: 1.7 M/S
ECHO AV VELOCITY RATIO: 0.76
ECHO AV VTI: 34.6 CM
ECHO BSA: 1.68 M2
ECHO LV E' LATERAL VELOCITY: 8 CM/S
ECHO LV FRACTIONAL SHORTENING: 31 % (ref 28–44)
ECHO LV INTERNAL DIMENSION DIASTOLE INDEX: 2.71 CM/M2
ECHO LV INTERNAL DIMENSION DIASTOLIC: 4.5 CM (ref 3.9–5.3)
ECHO LV INTERNAL DIMENSION SYSTOLIC INDEX: 1.87 CM/M2
ECHO LV INTERNAL DIMENSION SYSTOLIC: 3.1 CM
ECHO LV IVSD: 1.3 CM (ref 0.6–0.9)
ECHO LV MASS 2D: 222.6 G (ref 67–162)
ECHO LV MASS INDEX 2D: 134.1 G/M2 (ref 43–95)
ECHO LV POSTERIOR WALL DIASTOLIC: 1.3 CM (ref 0.6–0.9)
ECHO LV RELATIVE WALL THICKNESS RATIO: 0.58
ECHO LVOT AREA: 3.5 CM2
ECHO LVOT AV VTI INDEX: 0.74
ECHO LVOT DIAM: 2.1 CM
ECHO LVOT MEAN GRADIENT: 3 MMHG
ECHO LVOT PEAK GRADIENT: 7 MMHG
ECHO LVOT PEAK VELOCITY: 1.3 M/S
ECHO LVOT STROKE VOLUME INDEX: 53.2 ML/M2
ECHO LVOT SV: 88.3 ML
ECHO LVOT VTI: 25.5 CM
ECHO MV A VELOCITY: 1.11 M/S
ECHO MV E DECELERATION TIME (DT): 148.3 MS
ECHO MV E VELOCITY: 0.89 M/S
ECHO MV E/A RATIO: 0.8
ECHO MV E/E' LATERAL: 11.13
ECHO PV MAX VELOCITY: 1.1 M/S
ECHO PV PEAK GRADIENT: 5 MMHG
ECHO RV TAPSE: 1.8 CM (ref 1.7–?)
ERYTHROCYTE [DISTWIDTH] IN BLOOD BY AUTOMATED COUNT: 17.5 % (ref 11.5–14.5)
HCT VFR BLD AUTO: 29.3 % (ref 35–47)
HGB BLD-MCNC: 9.5 G/DL (ref 11.5–16)
INR PPP: 1.2 (ref 0.9–1.1)
MCH RBC QN AUTO: 29.9 PG (ref 26–34)
MCHC RBC AUTO-ENTMCNC: 32.4 G/DL (ref 30–36.5)
MCV RBC AUTO: 92.1 FL (ref 80–99)
NRBC # BLD: 0 K/UL (ref 0–0.01)
NRBC BLD-RTO: 0 PER 100 WBC
PLATELET # BLD AUTO: 242 K/UL (ref 150–400)
PMV BLD AUTO: 9.3 FL (ref 8.9–12.9)
PROTHROMBIN TIME: 12.2 SEC (ref 9–11.1)
RBC # BLD AUTO: 3.18 M/UL (ref 3.8–5.2)
SPECIMEN EXP DATE BLD: NORMAL
UNIT DIVISION: 0
WBC # BLD AUTO: 7.6 K/UL (ref 3.6–11)

## 2023-05-26 PROCEDURE — 6370000000 HC RX 637 (ALT 250 FOR IP): Performed by: INTERNAL MEDICINE

## 2023-05-26 PROCEDURE — 0DB68ZX EXCISION OF STOMACH, VIA NATURAL OR ARTIFICIAL OPENING ENDOSCOPIC, DIAGNOSTIC: ICD-10-PCS | Performed by: INTERNAL MEDICINE

## 2023-05-26 PROCEDURE — 7100000011 HC PHASE II RECOVERY - ADDTL 15 MIN: Performed by: INTERNAL MEDICINE

## 2023-05-26 PROCEDURE — 3700000001 HC ADD 15 MINUTES (ANESTHESIA): Performed by: INTERNAL MEDICINE

## 2023-05-26 PROCEDURE — 0DBM8ZZ EXCISION OF DESCENDING COLON, VIA NATURAL OR ARTIFICIAL OPENING ENDOSCOPIC: ICD-10-PCS | Performed by: INTERNAL MEDICINE

## 2023-05-26 PROCEDURE — 85610 PROTHROMBIN TIME: CPT

## 2023-05-26 PROCEDURE — 88342 IMHCHEM/IMCYTCHM 1ST ANTB: CPT

## 2023-05-26 PROCEDURE — 0DBL8ZZ EXCISION OF TRANSVERSE COLON, VIA NATURAL OR ARTIFICIAL OPENING ENDOSCOPIC: ICD-10-PCS | Performed by: INTERNAL MEDICINE

## 2023-05-26 PROCEDURE — 2500000003 HC RX 250 WO HCPCS: Performed by: NURSE ANESTHETIST, CERTIFIED REGISTERED

## 2023-05-26 PROCEDURE — 0DBK8ZZ EXCISION OF ASCENDING COLON, VIA NATURAL OR ARTIFICIAL OPENING ENDOSCOPIC: ICD-10-PCS | Performed by: INTERNAL MEDICINE

## 2023-05-26 PROCEDURE — 3600007502: Performed by: INTERNAL MEDICINE

## 2023-05-26 PROCEDURE — 0DBN8ZZ EXCISION OF SIGMOID COLON, VIA NATURAL OR ARTIFICIAL OPENING ENDOSCOPIC: ICD-10-PCS | Performed by: INTERNAL MEDICINE

## 2023-05-26 PROCEDURE — 88305 TISSUE EXAM BY PATHOLOGIST: CPT

## 2023-05-26 PROCEDURE — C1889 IMPLANT/INSERT DEVICE, NOC: HCPCS | Performed by: INTERNAL MEDICINE

## 2023-05-26 PROCEDURE — 93306 TTE W/DOPPLER COMPLETE: CPT

## 2023-05-26 PROCEDURE — 85027 COMPLETE CBC AUTOMATED: CPT

## 2023-05-26 PROCEDURE — 3600007512: Performed by: INTERNAL MEDICINE

## 2023-05-26 PROCEDURE — 3700000000 HC ANESTHESIA ATTENDED CARE: Performed by: INTERNAL MEDICINE

## 2023-05-26 PROCEDURE — 36415 COLL VENOUS BLD VENIPUNCTURE: CPT

## 2023-05-26 PROCEDURE — 1100000000 HC RM PRIVATE

## 2023-05-26 PROCEDURE — 7100000010 HC PHASE II RECOVERY - FIRST 15 MIN: Performed by: INTERNAL MEDICINE

## 2023-05-26 PROCEDURE — 6360000002 HC RX W HCPCS: Performed by: NURSE ANESTHETIST, CERTIFIED REGISTERED

## 2023-05-26 PROCEDURE — 2580000003 HC RX 258: Performed by: INTERNAL MEDICINE

## 2023-05-26 PROCEDURE — 2709999900 HC NON-CHARGEABLE SUPPLY: Performed by: INTERNAL MEDICINE

## 2023-05-26 DEVICE — CLIP
Type: IMPLANTABLE DEVICE | Site: TRANSVERSE COLON | Status: FUNCTIONAL
Brand: RESOLUTION 360™ ULTRA CLIP

## 2023-05-26 RX ORDER — GLYCOPYRROLATE 0.2 MG/ML
INJECTION INTRAMUSCULAR; INTRAVENOUS PRN
Status: DISCONTINUED | OUTPATIENT
Start: 2023-05-26 | End: 2023-05-26 | Stop reason: SDUPTHER

## 2023-05-26 RX ORDER — ATORVASTATIN CALCIUM 40 MG/1
80 TABLET, FILM COATED ORAL NIGHTLY
Status: DISCONTINUED | OUTPATIENT
Start: 2023-05-26 | End: 2023-06-02 | Stop reason: HOSPADM

## 2023-05-26 RX ORDER — TRAMADOL HYDROCHLORIDE 50 MG/1
50 TABLET ORAL EVERY 6 HOURS PRN
Qty: 5 TABLET | Refills: 0 | Status: SHIPPED
Start: 2023-05-26 | End: 2023-05-31 | Stop reason: SDUPTHER

## 2023-05-26 RX ORDER — SPIRONOLACTONE 25 MG/1
12.5 TABLET ORAL DAILY
Qty: 30 TABLET | Refills: 0 | Status: SHIPPED
Start: 2023-05-26

## 2023-05-26 RX ORDER — ASPIRIN 81 MG/1
81 TABLET, CHEWABLE ORAL DAILY
Status: DISCONTINUED | OUTPATIENT
Start: 2023-05-27 | End: 2023-06-02 | Stop reason: HOSPADM

## 2023-05-26 RX ORDER — ASPIRIN 81 MG/1
81 TABLET, CHEWABLE ORAL DAILY
Qty: 30 TABLET | Refills: 3 | Status: SHIPPED
Start: 2023-05-27

## 2023-05-26 RX ORDER — SPIRONOLACTONE 25 MG/1
12.5 TABLET ORAL DAILY
Status: DISCONTINUED | OUTPATIENT
Start: 2023-05-26 | End: 2023-06-02 | Stop reason: HOSPADM

## 2023-05-26 RX ORDER — SIMETHICONE 20 MG/.3ML
40 EMULSION ORAL EVERY 6 HOURS PRN
Status: DISCONTINUED | OUTPATIENT
Start: 2023-05-26 | End: 2023-06-02 | Stop reason: HOSPADM

## 2023-05-26 RX ORDER — LIDOCAINE HYDROCHLORIDE 20 MG/ML
INJECTION, SOLUTION EPIDURAL; INFILTRATION; INTRACAUDAL; PERINEURAL PRN
Status: DISCONTINUED | OUTPATIENT
Start: 2023-05-26 | End: 2023-05-26 | Stop reason: SDUPTHER

## 2023-05-26 RX ORDER — EPHEDRINE SULFATE/0.9% NACL/PF 50 MG/5 ML
SYRINGE (ML) INTRAVENOUS PRN
Status: DISCONTINUED | OUTPATIENT
Start: 2023-05-26 | End: 2023-05-26 | Stop reason: SDUPTHER

## 2023-05-26 RX ORDER — PANTOPRAZOLE SODIUM 40 MG/1
40 TABLET, DELAYED RELEASE ORAL 2 TIMES DAILY
Qty: 30 TABLET | Refills: 0 | Status: SHIPPED
Start: 2023-05-26 | End: 2023-06-25

## 2023-05-26 RX ADMIN — LIDOCAINE HYDROCHLORIDE 100 MG: 20 INJECTION, SOLUTION EPIDURAL; INFILTRATION; INTRACAUDAL; PERINEURAL at 07:42

## 2023-05-26 RX ADMIN — PROPOFOL 25 MG: 10 INJECTION, EMULSION INTRAVENOUS at 08:06

## 2023-05-26 RX ADMIN — SIMETHICONE 40 MG: 20 SUSPENSION/ DROPS ORAL at 08:02

## 2023-05-26 RX ADMIN — PROPOFOL 25 MG: 10 INJECTION, EMULSION INTRAVENOUS at 08:16

## 2023-05-26 RX ADMIN — PROPOFOL 25 MG: 10 INJECTION, EMULSION INTRAVENOUS at 08:13

## 2023-05-26 RX ADMIN — PROPOFOL 25 MG: 10 INJECTION, EMULSION INTRAVENOUS at 08:03

## 2023-05-26 RX ADMIN — PROPOFOL 30 MG: 10 INJECTION, EMULSION INTRAVENOUS at 08:33

## 2023-05-26 RX ADMIN — Medication: at 20:46

## 2023-05-26 RX ADMIN — FOLIC ACID 1 MG: 1 TABLET ORAL at 10:52

## 2023-05-26 RX ADMIN — PROPOFOL 20 MG: 10 INJECTION, EMULSION INTRAVENOUS at 08:09

## 2023-05-26 RX ADMIN — Medication 10 MG: at 08:13

## 2023-05-26 RX ADMIN — ATORVASTATIN CALCIUM 80 MG: 40 TABLET, FILM COATED ORAL at 20:43

## 2023-05-26 RX ADMIN — SACUBITRIL AND VALSARTAN 1 TABLET: 49; 51 TABLET, FILM COATED ORAL at 20:43

## 2023-05-26 RX ADMIN — SPIRONOLACTONE 12.5 MG: 25 TABLET ORAL at 10:52

## 2023-05-26 RX ADMIN — PROPOFOL 30 MG: 10 INJECTION, EMULSION INTRAVENOUS at 07:44

## 2023-05-26 RX ADMIN — PROPOFOL 25 MG: 10 INJECTION, EMULSION INTRAVENOUS at 08:19

## 2023-05-26 RX ADMIN — CASTOR OIL AND BALSAM, PERU: 788; 87 OINTMENT TOPICAL at 20:46

## 2023-05-26 RX ADMIN — PROPOFOL 25 MG: 10 INJECTION, EMULSION INTRAVENOUS at 07:56

## 2023-05-26 RX ADMIN — PROPOFOL 40 MG: 10 INJECTION, EMULSION INTRAVENOUS at 08:30

## 2023-05-26 RX ADMIN — Medication: at 10:57

## 2023-05-26 RX ADMIN — CASTOR OIL AND BALSAM, PERU: 788; 87 OINTMENT TOPICAL at 10:57

## 2023-05-26 RX ADMIN — PROPOFOL 30 MG: 10 INJECTION, EMULSION INTRAVENOUS at 07:50

## 2023-05-26 RX ADMIN — TRAMADOL HYDROCHLORIDE 50 MG: 50 TABLET ORAL at 02:17

## 2023-05-26 RX ADMIN — PROPOFOL 25 MG: 10 INJECTION, EMULSION INTRAVENOUS at 08:25

## 2023-05-26 RX ADMIN — PROPOFOL 30 MG: 10 INJECTION, EMULSION INTRAVENOUS at 08:00

## 2023-05-26 RX ADMIN — PROPOFOL 30 MG: 10 INJECTION, EMULSION INTRAVENOUS at 07:47

## 2023-05-26 RX ADMIN — POLYETHYLENE GLYCOL 3350 17 G: 17 POWDER, FOR SOLUTION ORAL at 10:54

## 2023-05-26 RX ADMIN — PROPOFOL 25 MG: 10 INJECTION, EMULSION INTRAVENOUS at 08:48

## 2023-05-26 RX ADMIN — PROPOFOL 25 MG: 10 INJECTION, EMULSION INTRAVENOUS at 08:42

## 2023-05-26 RX ADMIN — SODIUM CHLORIDE 25 ML/HR: 9 INJECTION, SOLUTION INTRAVENOUS at 07:26

## 2023-05-26 RX ADMIN — PROPOFOL 25 MG: 10 INJECTION, EMULSION INTRAVENOUS at 08:45

## 2023-05-26 RX ADMIN — PROPOFOL 60 MG: 10 INJECTION, EMULSION INTRAVENOUS at 07:42

## 2023-05-26 RX ADMIN — PROPOFOL 30 MG: 10 INJECTION, EMULSION INTRAVENOUS at 08:36

## 2023-05-26 RX ADMIN — GLYCOPYRROLATE 0.2 MG: 0.2 INJECTION, SOLUTION INTRAMUSCULAR; INTRAVENOUS at 07:42

## 2023-05-26 RX ADMIN — Medication 10 MG: at 07:54

## 2023-05-26 RX ADMIN — TRAMADOL HYDROCHLORIDE 50 MG: 50 TABLET ORAL at 20:43

## 2023-05-26 RX ADMIN — SACUBITRIL AND VALSARTAN 1 TABLET: 49; 51 TABLET, FILM COATED ORAL at 10:54

## 2023-05-26 RX ADMIN — PROPOFOL 25 MG: 10 INJECTION, EMULSION INTRAVENOUS at 07:53

## 2023-05-26 RX ADMIN — PROPOFOL 25 MG: 10 INJECTION, EMULSION INTRAVENOUS at 08:39

## 2023-05-26 ASSESSMENT — PAIN DESCRIPTION - LOCATION
LOCATION: HEAD
LOCATION: ABDOMEN

## 2023-05-26 ASSESSMENT — PAIN SCALES - GENERAL
PAINLEVEL_OUTOF10: 4
PAINLEVEL_OUTOF10: 0
PAINLEVEL_OUTOF10: 6
PAINLEVEL_OUTOF10: 6

## 2023-05-26 ASSESSMENT — PAIN DESCRIPTION - DESCRIPTORS
DESCRIPTORS: DISCOMFORT
DESCRIPTORS: ACHING

## 2023-05-26 ASSESSMENT — PAIN DESCRIPTION - ORIENTATION: ORIENTATION: MID

## 2023-05-26 ASSESSMENT — LIFESTYLE VARIABLES: SMOKING_STATUS: 1

## 2023-05-26 NOTE — ANESTHESIA PRE PROCEDURE
Department of Anesthesiology  Preprocedure Note       Name:  Thais Rivera   Age:  70 y.o.  :  1960                                          MRN:  [id number]         Date:  2023      Surgeon: Erika Blackmon):  Sunni Lima MD    Procedure: Procedure(s):  EGD ESOPHAGOGASTRODUODENOSCOPY  COLONOSCOPY DIAGNOSTIC    Medications prior to admission:   Prior to Admission medications    Medication Sig Start Date End Date Taking? Authorizing Provider   warfarin (COUMADIN) 10 MG tablet Take 0.5 tablets by mouth Twice a Week 5 mg on Tues, Thurs and 10 mg all other days   Yes Historical Provider, MD   atorvastatin (LIPITOR) 80 MG tablet Take by mouth daily 10/12/22   Ar Automatic Reconciliation   clopidogrel (PLAVIX) 75 MG tablet Take by mouth daily  Patient not taking: Reported on 2023 10/12/22   Ar Automatic Reconciliation   melatonin 3 MG TABS tablet Take by mouth 22   Ar Automatic Reconciliation   nitroGLYCERIN (NITROSTAT) 0.4 MG SL tablet DISSOLVE 1 TABLET UNDER THE TONGUE AS NEEDED FOR CHEST PAIN, MAY REPEAT EVERY 5 MINUTES FOR 3 TOTAL DOSES. IF PAIN IS NOT RELIEVED AFTER FIRS 10/18/21   Ar Automatic Reconciliation   pantoprazole (PROTONIX) 40 MG tablet Take by mouth every morning (before breakfast) 10/12/22   Ar Automatic Reconciliation   sacubitril-valsartan (ENTRESTO) 49-51 MG per tablet Take 1 tablet by mouth 2 times daily 10/12/22   Ar Automatic Reconciliation   spironolactone (ALDACTONE) 25 MG tablet Take by mouth daily  Patient not taking: Reported on 2023   Ar Automatic Reconciliation   warfarin (COUMADIN) 10 MG tablet Take 1 tablet by mouth Five times weekly 5 mg on Tues, Thurs and 10 mg all other days 22   Ar Automatic Reconciliation       Current medications:    Current Facility-Administered Medications   Medication Dose Route Frequency Provider Last Rate Last Admin    0.9 % sodium chloride infusion   IntraVENous PRN Shanice Lora MD        ammonium lactate

## 2023-05-26 NOTE — ANESTHESIA POSTPROCEDURE EVALUATION
Department of Anesthesiology  Postprocedure Note    Patient: Niurka Mae  MRN: [id number]  YOB: 1960  Date of evaluation: 5/26/2023      Procedure Summary     Date: 05/26/23 Room / Location: [facility] ENDO 03 / [facility] ENDOSCOPY    Anesthesia Start: 0736 Anesthesia Stop: Lauren Bains    Procedures:       EGD ESOPHAGOGASTRODUODENOSCOPY (Upper GI Region)      COLONOSCOPY DIAGNOSTIC (Lower GI Region) Diagnosis:       Anemia, unspecified type      (anemia)    Surgeons: Angela Crouch MD Responsible Provider: Henok Pablo MD    Anesthesia Type: MAC ASA Status: 2          Anesthesia Type: No value filed.     Joy Phase I: Joy Score: 10    Joy Phase II:        Anesthesia Post Evaluation    Patient location during evaluation: PACU  Patient participation: complete - patient participated  Level of consciousness: sleepy but conscious and responsive to verbal stimuli  Airway patency: patent  Nausea & Vomiting: no vomiting and no nausea  Complications: no  Cardiovascular status: blood pressure returned to baseline and hemodynamically stable  Respiratory status: acceptable  Hydration status: stable

## 2023-05-27 LAB
COMMENT:: NORMAL
COMMENT:: NORMAL
HCT VFR BLD AUTO: 27.4 % (ref 35–47)
HGB BLD-MCNC: 8.9 G/DL (ref 11.5–16)
INR PPP: 1.2 (ref 0.9–1.1)
PROTHROMBIN TIME: 12.4 SEC (ref 9–11.1)
SPECIMEN HOLD: NORMAL
SPECIMEN HOLD: NORMAL

## 2023-05-27 PROCEDURE — 36415 COLL VENOUS BLD VENIPUNCTURE: CPT

## 2023-05-27 PROCEDURE — 97535 SELF CARE MNGMENT TRAINING: CPT | Performed by: OCCUPATIONAL THERAPIST

## 2023-05-27 PROCEDURE — 6370000000 HC RX 637 (ALT 250 FOR IP): Performed by: INTERNAL MEDICINE

## 2023-05-27 PROCEDURE — 6370000000 HC RX 637 (ALT 250 FOR IP)

## 2023-05-27 PROCEDURE — 1100000000 HC RM PRIVATE

## 2023-05-27 PROCEDURE — 85018 HEMOGLOBIN: CPT

## 2023-05-27 PROCEDURE — 85610 PROTHROMBIN TIME: CPT

## 2023-05-27 PROCEDURE — 85014 HEMATOCRIT: CPT

## 2023-05-27 PROCEDURE — 97165 OT EVAL LOW COMPLEX 30 MIN: CPT | Performed by: OCCUPATIONAL THERAPIST

## 2023-05-27 RX ADMIN — SACUBITRIL AND VALSARTAN 1 TABLET: 49; 51 TABLET, FILM COATED ORAL at 08:56

## 2023-05-27 RX ADMIN — Medication: at 21:17

## 2023-05-27 RX ADMIN — CASTOR OIL AND BALSAM, PERU: 788; 87 OINTMENT TOPICAL at 21:17

## 2023-05-27 RX ADMIN — SACUBITRIL AND VALSARTAN 1 TABLET: 49; 51 TABLET, FILM COATED ORAL at 21:12

## 2023-05-27 RX ADMIN — SPIRONOLACTONE 12.5 MG: 25 TABLET ORAL at 08:55

## 2023-05-27 RX ADMIN — POLYETHYLENE GLYCOL 3350 17 G: 17 POWDER, FOR SOLUTION ORAL at 08:58

## 2023-05-27 RX ADMIN — Medication: at 09:08

## 2023-05-27 RX ADMIN — CASTOR OIL AND BALSAM, PERU: 788; 87 OINTMENT TOPICAL at 08:58

## 2023-05-27 RX ADMIN — PANTOPRAZOLE SODIUM 40 MG: 40 TABLET, DELAYED RELEASE ORAL at 06:33

## 2023-05-27 RX ADMIN — FOLIC ACID 1 MG: 1 TABLET ORAL at 08:55

## 2023-05-27 RX ADMIN — TRAMADOL HYDROCHLORIDE 50 MG: 50 TABLET ORAL at 21:15

## 2023-05-27 RX ADMIN — ASPIRIN 81 MG: 81 TABLET, CHEWABLE ORAL at 08:56

## 2023-05-27 ASSESSMENT — PAIN SCALES - GENERAL: PAINLEVEL_OUTOF10: 5

## 2023-05-27 ASSESSMENT — PAIN DESCRIPTION - DESCRIPTORS: DESCRIPTORS: ACHING

## 2023-05-27 ASSESSMENT — PAIN - FUNCTIONAL ASSESSMENT: PAIN_FUNCTIONAL_ASSESSMENT: ACTIVITIES ARE NOT PREVENTED

## 2023-05-27 ASSESSMENT — PAIN DESCRIPTION - ORIENTATION: ORIENTATION: RIGHT;LEFT

## 2023-05-27 ASSESSMENT — PAIN DESCRIPTION - LOCATION: LOCATION: LEG

## 2023-05-28 LAB
ERYTHROCYTE [DISTWIDTH] IN BLOOD BY AUTOMATED COUNT: 17.2 % (ref 11.5–14.5)
HCT VFR BLD AUTO: 27.8 % (ref 35–47)
HGB BLD-MCNC: 8.7 G/DL (ref 11.5–16)
MCH RBC QN AUTO: 29.4 PG (ref 26–34)
MCHC RBC AUTO-ENTMCNC: 31.3 G/DL (ref 30–36.5)
MCV RBC AUTO: 93.9 FL (ref 80–99)
NRBC # BLD: 0 K/UL (ref 0–0.01)
NRBC BLD-RTO: 0 PER 100 WBC
PLATELET # BLD AUTO: 218 K/UL (ref 150–400)
PMV BLD AUTO: 9.8 FL (ref 8.9–12.9)
RBC # BLD AUTO: 2.96 M/UL (ref 3.8–5.2)
WBC # BLD AUTO: 6.5 K/UL (ref 3.6–11)

## 2023-05-28 PROCEDURE — 1100000000 HC RM PRIVATE

## 2023-05-28 PROCEDURE — 36415 COLL VENOUS BLD VENIPUNCTURE: CPT

## 2023-05-28 PROCEDURE — 85027 COMPLETE CBC AUTOMATED: CPT

## 2023-05-28 PROCEDURE — 6370000000 HC RX 637 (ALT 250 FOR IP)

## 2023-05-28 PROCEDURE — 6370000000 HC RX 637 (ALT 250 FOR IP): Performed by: INTERNAL MEDICINE

## 2023-05-28 RX ADMIN — Medication: at 21:18

## 2023-05-28 RX ADMIN — SACUBITRIL AND VALSARTAN 1 TABLET: 49; 51 TABLET, FILM COATED ORAL at 21:17

## 2023-05-28 RX ADMIN — ATORVASTATIN CALCIUM 80 MG: 40 TABLET, FILM COATED ORAL at 21:18

## 2023-05-28 RX ADMIN — FOLIC ACID 1 MG: 1 TABLET ORAL at 09:05

## 2023-05-28 RX ADMIN — SACUBITRIL AND VALSARTAN 1 TABLET: 49; 51 TABLET, FILM COATED ORAL at 09:04

## 2023-05-28 RX ADMIN — TRAMADOL HYDROCHLORIDE 50 MG: 50 TABLET ORAL at 18:11

## 2023-05-28 RX ADMIN — PANTOPRAZOLE SODIUM 40 MG: 40 TABLET, DELAYED RELEASE ORAL at 06:04

## 2023-05-28 RX ADMIN — Medication: at 09:07

## 2023-05-28 RX ADMIN — CASTOR OIL AND BALSAM, PERU: 788; 87 OINTMENT TOPICAL at 09:08

## 2023-05-28 RX ADMIN — CASTOR OIL AND BALSAM, PERU: 788; 87 OINTMENT TOPICAL at 21:19

## 2023-05-28 RX ADMIN — ASPIRIN 81 MG: 81 TABLET, CHEWABLE ORAL at 09:05

## 2023-05-28 RX ADMIN — SPIRONOLACTONE 12.5 MG: 25 TABLET ORAL at 09:05

## 2023-05-28 ASSESSMENT — PAIN DESCRIPTION - LOCATION
LOCATION: NECK
LOCATION: NECK;THROAT

## 2023-05-28 ASSESSMENT — PAIN DESCRIPTION - DESCRIPTORS
DESCRIPTORS: ACHING;SHOOTING
DESCRIPTORS: ACHING

## 2023-05-28 ASSESSMENT — PAIN SCALES - GENERAL
PAINLEVEL_OUTOF10: 7
PAINLEVEL_OUTOF10: 6

## 2023-05-28 ASSESSMENT — PAIN DESCRIPTION - ORIENTATION
ORIENTATION: LEFT
ORIENTATION: LEFT

## 2023-05-28 ASSESSMENT — PAIN - FUNCTIONAL ASSESSMENT: PAIN_FUNCTIONAL_ASSESSMENT: ACTIVITIES ARE NOT PREVENTED

## 2023-05-29 PROCEDURE — 6370000000 HC RX 637 (ALT 250 FOR IP): Performed by: INTERNAL MEDICINE

## 2023-05-29 PROCEDURE — 30233N1 TRANSFUSION OF NONAUTOLOGOUS RED BLOOD CELLS INTO PERIPHERAL VEIN, PERCUTANEOUS APPROACH: ICD-10-PCS | Performed by: INTERNAL MEDICINE

## 2023-05-29 PROCEDURE — 1100000000 HC RM PRIVATE

## 2023-05-29 RX ORDER — ACETAMINOPHEN 325 MG/1
325 TABLET ORAL EVERY 4 HOURS PRN
Status: DISCONTINUED | OUTPATIENT
Start: 2023-05-29 | End: 2023-06-02 | Stop reason: HOSPADM

## 2023-05-29 RX ADMIN — CASTOR OIL AND BALSAM, PERU: 788; 87 OINTMENT TOPICAL at 20:26

## 2023-05-29 RX ADMIN — SACUBITRIL AND VALSARTAN 1 TABLET: 49; 51 TABLET, FILM COATED ORAL at 20:24

## 2023-05-29 RX ADMIN — SACUBITRIL AND VALSARTAN 1 TABLET: 49; 51 TABLET, FILM COATED ORAL at 10:52

## 2023-05-29 RX ADMIN — CASTOR OIL AND BALSAM, PERU: 788; 87 OINTMENT TOPICAL at 10:58

## 2023-05-29 RX ADMIN — ASPIRIN 81 MG: 81 TABLET, CHEWABLE ORAL at 10:47

## 2023-05-29 RX ADMIN — Medication: at 20:26

## 2023-05-29 RX ADMIN — FOLIC ACID 1 MG: 1 TABLET ORAL at 10:47

## 2023-05-29 RX ADMIN — SPIRONOLACTONE 12.5 MG: 25 TABLET ORAL at 10:47

## 2023-05-29 RX ADMIN — Medication: at 11:00

## 2023-05-29 RX ADMIN — ATORVASTATIN CALCIUM 80 MG: 40 TABLET, FILM COATED ORAL at 20:24

## 2023-05-29 ASSESSMENT — PAIN SCALES - GENERAL
PAINLEVEL_OUTOF10: 4
PAINLEVEL_OUTOF10: 4

## 2023-05-30 LAB
ERYTHROCYTE [DISTWIDTH] IN BLOOD BY AUTOMATED COUNT: 16.1 % (ref 11.5–14.5)
HCT VFR BLD AUTO: 27.4 % (ref 35–47)
HGB BLD-MCNC: 9 G/DL (ref 11.5–16)
MCH RBC QN AUTO: 30.4 PG (ref 26–34)
MCHC RBC AUTO-ENTMCNC: 32.8 G/DL (ref 30–36.5)
MCV RBC AUTO: 92.6 FL (ref 80–99)
NRBC # BLD: 0 K/UL (ref 0–0.01)
NRBC BLD-RTO: 0 PER 100 WBC
PLATELET # BLD AUTO: 234 K/UL (ref 150–400)
PMV BLD AUTO: 9.6 FL (ref 8.9–12.9)
RBC # BLD AUTO: 2.96 M/UL (ref 3.8–5.2)
WBC # BLD AUTO: 6.3 K/UL (ref 3.6–11)

## 2023-05-30 PROCEDURE — 6370000000 HC RX 637 (ALT 250 FOR IP): Performed by: INTERNAL MEDICINE

## 2023-05-30 PROCEDURE — 36415 COLL VENOUS BLD VENIPUNCTURE: CPT

## 2023-05-30 PROCEDURE — 97530 THERAPEUTIC ACTIVITIES: CPT | Performed by: OCCUPATIONAL THERAPIST

## 2023-05-30 PROCEDURE — 97116 GAIT TRAINING THERAPY: CPT

## 2023-05-30 PROCEDURE — 85027 COMPLETE CBC AUTOMATED: CPT

## 2023-05-30 PROCEDURE — 1100000000 HC RM PRIVATE

## 2023-05-30 PROCEDURE — 6370000000 HC RX 637 (ALT 250 FOR IP)

## 2023-05-30 RX ADMIN — FOLIC ACID 1 MG: 1 TABLET ORAL at 09:00

## 2023-05-30 RX ADMIN — TRAMADOL HYDROCHLORIDE 50 MG: 50 TABLET ORAL at 01:12

## 2023-05-30 RX ADMIN — TRAMADOL HYDROCHLORIDE 50 MG: 50 TABLET ORAL at 09:05

## 2023-05-30 RX ADMIN — POLYETHYLENE GLYCOL 3350 17 G: 17 POWDER, FOR SOLUTION ORAL at 08:59

## 2023-05-30 RX ADMIN — SPIRONOLACTONE 12.5 MG: 25 TABLET ORAL at 08:59

## 2023-05-30 RX ADMIN — PANTOPRAZOLE SODIUM 40 MG: 40 TABLET, DELAYED RELEASE ORAL at 06:34

## 2023-05-30 RX ADMIN — CASTOR OIL AND BALSAM, PERU: 788; 87 OINTMENT TOPICAL at 21:20

## 2023-05-30 RX ADMIN — SACUBITRIL AND VALSARTAN 1 TABLET: 49; 51 TABLET, FILM COATED ORAL at 21:19

## 2023-05-30 RX ADMIN — Medication: at 21:20

## 2023-05-30 RX ADMIN — ASPIRIN 81 MG: 81 TABLET, CHEWABLE ORAL at 09:00

## 2023-05-30 RX ADMIN — ATORVASTATIN CALCIUM 80 MG: 40 TABLET, FILM COATED ORAL at 21:19

## 2023-05-30 RX ADMIN — CASTOR OIL AND BALSAM, PERU: 788; 87 OINTMENT TOPICAL at 09:01

## 2023-05-30 RX ADMIN — SACUBITRIL AND VALSARTAN 1 TABLET: 49; 51 TABLET, FILM COATED ORAL at 09:00

## 2023-05-30 RX ADMIN — TRAMADOL HYDROCHLORIDE 50 MG: 50 TABLET ORAL at 21:19

## 2023-05-30 RX ADMIN — Medication: at 09:01

## 2023-05-30 ASSESSMENT — PAIN SCALES - GENERAL
PAINLEVEL_OUTOF10: 3
PAINLEVEL_OUTOF10: 3
PAINLEVEL_OUTOF10: 5
PAINLEVEL_OUTOF10: 6
PAINLEVEL_OUTOF10: 3
PAINLEVEL_OUTOF10: 6

## 2023-05-30 ASSESSMENT — PAIN DESCRIPTION - LOCATION
LOCATION: FOOT;THROAT
LOCATION: THROAT;BACK

## 2023-05-30 ASSESSMENT — PAIN DESCRIPTION - ORIENTATION
ORIENTATION: LEFT
ORIENTATION: LEFT

## 2023-05-30 ASSESSMENT — PAIN DESCRIPTION - DESCRIPTORS
DESCRIPTORS: ACHING
DESCRIPTORS: ACHING;SORE

## 2023-05-31 ENCOUNTER — APPOINTMENT (OUTPATIENT)
Facility: HOSPITAL | Age: 63
End: 2023-05-31
Payer: COMMERCIAL

## 2023-05-31 PROCEDURE — 1100000000 HC RM PRIVATE

## 2023-05-31 PROCEDURE — 6370000000 HC RX 637 (ALT 250 FOR IP)

## 2023-05-31 PROCEDURE — 70450 CT HEAD/BRAIN W/O DYE: CPT

## 2023-05-31 PROCEDURE — 6370000000 HC RX 637 (ALT 250 FOR IP): Performed by: INTERNAL MEDICINE

## 2023-05-31 RX ORDER — TRAMADOL HYDROCHLORIDE 50 MG/1
50 TABLET ORAL EVERY 8 HOURS PRN
Qty: 5 TABLET | Refills: 0 | Status: SHIPPED | OUTPATIENT
Start: 2023-05-31 | End: 2023-06-03

## 2023-05-31 RX ADMIN — TRAMADOL HYDROCHLORIDE 50 MG: 50 TABLET ORAL at 14:55

## 2023-05-31 RX ADMIN — SPIRONOLACTONE 12.5 MG: 25 TABLET ORAL at 08:11

## 2023-05-31 RX ADMIN — SACUBITRIL AND VALSARTAN 1 TABLET: 49; 51 TABLET, FILM COATED ORAL at 20:34

## 2023-05-31 RX ADMIN — Medication: at 20:48

## 2023-05-31 RX ADMIN — CASTOR OIL AND BALSAM, PERU: 788; 87 OINTMENT TOPICAL at 08:14

## 2023-05-31 RX ADMIN — TRAMADOL HYDROCHLORIDE 50 MG: 50 TABLET ORAL at 08:10

## 2023-05-31 RX ADMIN — SACUBITRIL AND VALSARTAN 1 TABLET: 49; 51 TABLET, FILM COATED ORAL at 08:14

## 2023-05-31 RX ADMIN — CASTOR OIL AND BALSAM, PERU: 788; 87 OINTMENT TOPICAL at 20:48

## 2023-05-31 RX ADMIN — PANTOPRAZOLE SODIUM 40 MG: 40 TABLET, DELAYED RELEASE ORAL at 08:11

## 2023-05-31 RX ADMIN — FOLIC ACID 1 MG: 1 TABLET ORAL at 08:11

## 2023-05-31 RX ADMIN — ASPIRIN 81 MG: 81 TABLET, CHEWABLE ORAL at 08:10

## 2023-05-31 RX ADMIN — POLYETHYLENE GLYCOL 3350 17 G: 17 POWDER, FOR SOLUTION ORAL at 08:10

## 2023-05-31 RX ADMIN — ATORVASTATIN CALCIUM 80 MG: 40 TABLET, FILM COATED ORAL at 20:34

## 2023-05-31 RX ADMIN — Medication: at 08:14

## 2023-05-31 ASSESSMENT — PAIN SCALES - GENERAL
PAINLEVEL_OUTOF10: 3
PAINLEVEL_OUTOF10: 6
PAINLEVEL_OUTOF10: 0
PAINLEVEL_OUTOF10: 7

## 2023-05-31 ASSESSMENT — PAIN DESCRIPTION - LOCATION
LOCATION: OTHER (COMMENT)
LOCATION: NECK

## 2023-05-31 ASSESSMENT — PAIN DESCRIPTION - DESCRIPTORS
DESCRIPTORS: ACHING;CRAMPING
DESCRIPTORS: ACHING;CRAMPING

## 2023-05-31 ASSESSMENT — PAIN DESCRIPTION - ORIENTATION: ORIENTATION: LEFT

## 2023-05-31 NOTE — DISCHARGE SUMMARY
Discharge Summary    Name: Terry Sotelo  [id number]  YOB: 1960 (Age: 70 y.o.)   Date of Admission: 5/16/2023  Date of Discharge: 6/2/2023  Attending Physician: Ankush Pinzon MD    Discharge Diagnosis:   Anemia, concerning for blood loss  Acute hyponatremia, likely hypovolemic, resolved  Left baker's cyst  CAD, hx stents  Hx of LV thrombus in 2021 at [address]:  [facility] CONSULT TO EVAL  IP WOUND CARE NURSE CONSULT TO EVAL  IP CONSULT TO GI  IP CONSULT TO CARDIOLOGY  IP CONSULT TO CASE MANAGEMENT  IP CONSULT TO CASE MANAGEMENT  IP CONSULT TO CASE MANAGEMENT      Brief Admission History/Reason for Admission Per Carrington Pollard DO:   Sharon Tee is a 70 y.o. female with a past history of CAD  who presents via EMS with 2 weeks of 10 out of 10 constant, achy left leg pain and swelling that is much worse with weightbearing. This is the 3rd ER visit for this problem. She was first seen on 5/05 and 5/09 at [facility] ED and dopplers were negative and she was discharged on lasix. Her Na 138 back on 5/05. She reports pain and swelling persisted so she presents back today, this time her Na 121. She reports feeling dizzy and weak recently but denies disorientation, falling or LOC. She does take Coumadin for history of \"heart problems\", denies CP, SOB, fevers or chills. Repeat dopplers no DVT. We were asked to admit for work up and evaluation of the above problems. Brief Hospital Course by Main Problems:   Left leg pain with difficulty ambulating  Left baker's cyst  -dopplers no DVT but shows baker's cyst  -ROSEMARY     Right side findings: Resting ROSEMARY is 1. 11. This is within the normal range. Right TBI was not obtained due to severely decreased PPG signal.    Left side findings: Resting ROSEMARY is 1.01. This is within the normal range. Moderately decreased resting left TBI.   -tender to touch
100 %   05/29/23 1903 (!) 152/75 98.8 °F (37.1 °C) Oral 61 16 97 %   05/29/23 1510 (!) 147/82 99.1 °F (37.3 °C) Oral (!) 102 17 99 %   05/29/23 1123 (!) 142/76 98.2 °F (36.8 °C) -- 71 20 97 %       Gen:    Not in distress  Chest: Clear lungs  CVS:   Regular rhythm. LLE 1+ edema  Abd:  Soft, not distended, not tender  Neuro:    Discharge/Recent Laboratory Results:  No results for input(s): NA, K, CL, CO2, BUN, CREATININE, GLUCOSE, CALCIUM, PHOS, MG in the last 72 hours. Recent Labs     05/30/23  0128   HGB 9.0*   HCT 27.4*   WBC 6.3          Discharge Medications:     Medication List        START taking these medications      aspirin 81 MG chewable tablet  Take 1 tablet by mouth daily            CHANGE how you take these medications      pantoprazole 40 MG tablet  Commonly known as: PROTONIX  Take 1 tablet by mouth in the morning and at bedtime  What changed:   how much to take  when to take this     spironolactone 25 MG tablet  Commonly known as: ALDACTONE  Take 0.5 tablets by mouth daily  What changed: how much to take            CONTINUE taking these medications      atorvastatin 80 MG tablet  Commonly known as: LIPITOR     Entresto 49-51 MG per tablet  Generic drug: sacubitril-valsartan     melatonin 3 MG Tabs tablet     nitroGLYCERIN 0.4 MG SL tablet  Commonly known as: NITROSTAT            STOP taking these medications      clopidogrel 75 MG tablet  Commonly known as: PLAVIX     warfarin 10 MG tablet  Commonly known as: COUMADIN            ASK your doctor about these medications      traMADol 50 MG tablet  Commonly known as: ULTRAM  Take 1 tablet by mouth every 6 hours as needed for Pain for up to 3 days. Max Daily Amount: 200 mg  Ask about: Should I take this medication?                Where to Get Your Medications        Information about where to get these medications is not yet available    Ask your nurse or doctor about these medications  aspirin 81 MG chewable tablet  pantoprazole 40 MG

## 2023-05-31 NOTE — CARE COORDINATION
CM Manager informed unit CM that patient now requesting IPR.   CM will obtain [facility] and send referral.      Patient will require insurance auth from [address] [place] [address] Philipp, RN, BSN, [address] [place] [address]  Manager of Case Management  [phone]

## 2023-06-01 LAB
ERYTHROCYTE [DISTWIDTH] IN BLOOD BY AUTOMATED COUNT: 15.9 % (ref 11.5–14.5)
HCT VFR BLD AUTO: 28.4 % (ref 35–47)
HGB BLD-MCNC: 9 G/DL (ref 11.5–16)
MCH RBC QN AUTO: 29.5 PG (ref 26–34)
MCHC RBC AUTO-ENTMCNC: 31.7 G/DL (ref 30–36.5)
MCV RBC AUTO: 93.1 FL (ref 80–99)
NRBC # BLD: 0 K/UL (ref 0–0.01)
NRBC BLD-RTO: 0 PER 100 WBC
PLATELET # BLD AUTO: 270 K/UL (ref 150–400)
PMV BLD AUTO: 9.5 FL (ref 8.9–12.9)
RBC # BLD AUTO: 3.05 M/UL (ref 3.8–5.2)
WBC # BLD AUTO: 5.8 K/UL (ref 3.6–11)

## 2023-06-01 PROCEDURE — 97116 GAIT TRAINING THERAPY: CPT | Performed by: PHYSICAL THERAPIST

## 2023-06-01 PROCEDURE — 85027 COMPLETE CBC AUTOMATED: CPT

## 2023-06-01 PROCEDURE — 6370000000 HC RX 637 (ALT 250 FOR IP)

## 2023-06-01 PROCEDURE — 97530 THERAPEUTIC ACTIVITIES: CPT | Performed by: OCCUPATIONAL THERAPIST

## 2023-06-01 PROCEDURE — 6370000000 HC RX 637 (ALT 250 FOR IP): Performed by: INTERNAL MEDICINE

## 2023-06-01 PROCEDURE — 97535 SELF CARE MNGMENT TRAINING: CPT | Performed by: OCCUPATIONAL THERAPIST

## 2023-06-01 PROCEDURE — 36415 COLL VENOUS BLD VENIPUNCTURE: CPT

## 2023-06-01 PROCEDURE — 1100000000 HC RM PRIVATE

## 2023-06-01 RX ADMIN — ATORVASTATIN CALCIUM 80 MG: 40 TABLET, FILM COATED ORAL at 20:57

## 2023-06-01 RX ADMIN — FOLIC ACID 1 MG: 1 TABLET ORAL at 09:57

## 2023-06-01 RX ADMIN — TRAMADOL HYDROCHLORIDE 50 MG: 50 TABLET ORAL at 05:08

## 2023-06-01 RX ADMIN — ASPIRIN 81 MG: 81 TABLET, CHEWABLE ORAL at 09:57

## 2023-06-01 RX ADMIN — CASTOR OIL AND BALSAM, PERU: 788; 87 OINTMENT TOPICAL at 20:59

## 2023-06-01 RX ADMIN — SACUBITRIL AND VALSARTAN 1 TABLET: 49; 51 TABLET, FILM COATED ORAL at 14:38

## 2023-06-01 RX ADMIN — SACUBITRIL AND VALSARTAN 1 TABLET: 49; 51 TABLET, FILM COATED ORAL at 20:57

## 2023-06-01 RX ADMIN — TRAMADOL HYDROCHLORIDE 50 MG: 50 TABLET ORAL at 20:57

## 2023-06-01 RX ADMIN — PANTOPRAZOLE SODIUM 40 MG: 40 TABLET, DELAYED RELEASE ORAL at 05:09

## 2023-06-01 RX ADMIN — CASTOR OIL AND BALSAM, PERU: 788; 87 OINTMENT TOPICAL at 09:58

## 2023-06-01 RX ADMIN — Medication: at 21:02

## 2023-06-01 RX ADMIN — SPIRONOLACTONE 12.5 MG: 25 TABLET ORAL at 09:57

## 2023-06-01 RX ADMIN — Medication: at 09:58

## 2023-06-01 ASSESSMENT — PAIN DESCRIPTION - LOCATION
LOCATION: FOOT
LOCATION: SHOULDER

## 2023-06-01 ASSESSMENT — PAIN DESCRIPTION - DESCRIPTORS
DESCRIPTORS: ACHING
DESCRIPTORS: ACHING

## 2023-06-01 ASSESSMENT — PAIN - FUNCTIONAL ASSESSMENT
PAIN_FUNCTIONAL_ASSESSMENT: ACTIVITIES ARE NOT PREVENTED
PAIN_FUNCTIONAL_ASSESSMENT: ACTIVITIES ARE NOT PREVENTED

## 2023-06-01 ASSESSMENT — PAIN SCALES - GENERAL
PAINLEVEL_OUTOF10: 6
PAINLEVEL_OUTOF10: 6

## 2023-06-01 ASSESSMENT — PAIN DESCRIPTION - ORIENTATION: ORIENTATION: RIGHT;LEFT

## 2023-06-01 NOTE — PLAN OF CARE
Problem: Occupational Therapy - Adult  Goal: By Discharge: Performs self-care activities at highest level of function for planned discharge setting. See evaluation for individualized goals. Description: FUNCTIONAL STATUS PRIOR TO ADMISSION:    Receives Help From: Friend(s), ADL Assistance: Independent,  ,  ,  ,  ,  , Homemaking Assistance: Independent, Ambulation Assistance: Independent, Transfer Assistance: Independent, Active : No     HOME SUPPORT: Patient lived alone with friend to provide assistance. Occupational Therapy Goals:  Initiated 5/27/2023, Goals remain appropriate as per 6/1 weekly reevaluation. 1.  Patient will perform grooming standing with Modified Pablo within 7 day(s). 2.  Patient will perform upper body dressing and lower body dressing with Modified Pablo within 7 day(s). 3.  Patient will perform toileting with Modified Pablo within 7 day(s). 4.  Patient will perform toilet transfers with Modified Pablo  within 7 day(s). Outcome: Progressing     OCCUPATIONAL THERAPY TREATMENT: WEEKLY REASSESSMENT    Patient: Camila Abreu (82 y.o. female)  Date: 6/1/2023  Primary Diagnosis: Hyponatremia [E87.1]  Left leg swelling [M79.89]  Procedure(s) (LRB):  EGD ESOPHAGOGASTRODUODENOSCOPY (N/A)  COLONOSCOPY DIAGNOSTIC (N/A) 6 Days Post-Op   Precautions: Fall Risk                Chart, occupational therapy assessment, plan of care, and goals were reviewed. ASSESSMENT  Patient continues to benefit from skilled OT services and is progressing towards goals. She was initially reluctant to work with OT, but became agreeable after minimal encouragement was provided. Overall she was supervision for bed mobility, CGA for transfers and ambulation with a RW, she was SBA for standing grooming, CGA for toileting and was supervision/setup for seated dressing ADLs.  Minimal cueing was needed for safe/proper hand placement during sit to/from stand transfers, as well as for
Problem: Physical Therapy - Adult  Goal: By Discharge: Performs mobility at highest level of function for planned discharge setting. See evaluation for individualized goals. Description: FUNCTIONAL STATUS PRIOR TO ADMISSION: Patient was independent and active without use of DME.    HOME SUPPORT PRIOR TO ADMISSION: The patient lives alone, 1-story apartment with no steps to enter. Physical Therapy Goals  Weekly re-assessment 5/30/23- goals still remain appropriate  1. Patient will move from supine to sit and sit to supine in bed with modified independence within 7 day(s). 2.  Patient will perform sit to stand with supervision/set-up within 7 day(s). 3.  Patient will transfer from bed to chair and chair to bed with supervision/set-up using the least restrictive device within 7 day(s). 4.  Patient will ambulate with contact guard assist for 150 feet with the least restrictive device within 7 day(s). Physical Therapy Goals  Initiated 5/19/2023  1. Patient will move from supine to sit and sit to supine in bed with modified independence within 7 day(s). 2.  Patient will perform sit to stand with supervision/set-up within 7 day(s). 3.  Patient will transfer from bed to chair and chair to bed with supervision/set-up using the least restrictive device within 7 day(s). 4.  Patient will ambulate with contact guard assist for 150 feet with the least restrictive device within 7 day(s). Outcome: Progressing    PHYSICAL THERAPY TREATMENT: WEEKLY REASSESSMENT    Patient: Steffen Estes (40 y.o. female)  Date: 5/30/2023  Primary Diagnosis: Hyponatremia [E87.1]  Left leg swelling [M79.89]  Procedure(s) (LRB):  EGD ESOPHAGOGASTRODUODENOSCOPY (N/A)  COLONOSCOPY DIAGNOSTIC (N/A) 4 Days Post-Op   Precautions: Fall Risk                    ASSESSMENT :  Patient continues to benefit from skilled PT services and is slowly progressing towards goals.  Patient was in bed, but agreeable to get up and mobilize with PT.
Problem: Safety - Adult  Goal: Free from fall injury  Outcome: Progressing
Problem: Skin/Tissue Integrity  Goal: Absence of new skin breakdown  Description: 1. Monitor for areas of redness and/or skin breakdown  2. Assess vascular access sites hourly  3. Every 4-6 hours minimum:  Change oxygen saturation probe site  4. Every 4-6 hours:  If on nasal continuous positive airway pressure, respiratory therapy assess nares and determine need for appliance change or resting period.   Outcome: Progressing     Problem: Safety - Adult  Goal: Free from fall injury  Outcome: Progressing     Problem: Pain  Goal: Verbalizes/displays adequate comfort level or baseline comfort level  Outcome: Progressing     Problem: Discharge Planning  Goal: Discharge to home or other facility with appropriate resources  Outcome: Progressing  Flowsheets (Taken 5/30/2023 2119 by Jaylan Awad LPN)  Discharge to home or other facility with appropriate resources: Identify barriers to discharge with patient and caregiver     Problem: Hematologic - Adult  Goal: Maintains hematologic stability  Outcome: Progressing  Flowsheets (Taken 5/30/2023 2119 by Jaylan Awad LPN)  Maintains hematologic stability: Assess for signs and symptoms of bleeding or hemorrhage
L LE but demonstrates improved ability to bear weight through L LE and also demonstrated improved distance walked today. Patient requiring CGA for overall mobility. Gait is slow and antalgic demonstrating large lurching step on L with step to on R. Rachel is non-functional requiring greater than 5 minutes to ambulate 75 feet. Continue to recommend IPR following discharge as patient is able to tolerate the intensity of IPR and is more appropriate setting for patient to regain her functional independence         PLAN:  Patient continues to benefit from skilled intervention to address the above impairments. Continue treatment per established plan of care. Recommendation for discharge: (in order for the patient to meet his/her long term goals): Therapy 3 hours/day 5-7 days/week    Other factors to consider for discharge: lives alone and concern for safely navigating or managing the home environment    IF patient discharges home will need the following DME:  RW has been delivered for discharge       SUBJECTIVE:   Patient stated, \"I know I need to go to rehab. \"    OBJECTIVE DATA SUMMARY:   Critical Behavior:  Orientation  Overall Orientation Status: Within Normal Limits  Orientation Level: Oriented X4  Cognition  Overall Cognitive Status: Exceptions  Safety Judgement: Decreased awareness of need for safety    Functional Mobility Training:  Bed Mobility:  Bed Mobility Training  Sit to Supine: Supervision  Scooting: Supervision  Transfers:  Transfer Training  Overall Level of Assistance: Contact-guard assistance  Sit to Stand: Contact-guard assistance  Stand to Sit: Contact-guard assistance  Toilet Transfer: Stand-by assistance (using grab bar)  Balance:  Balance  Sitting: Intact  Standing: Impaired  Standing - Static: Good  Standing - Dynamic: Good;Constant support   Ambulation/Gait Training:     Gait  Overall Level of Assistance: Contact-guard assistance  Interventions: Verbal cues  Base of Support: Shift to
Good  Standing - Dynamic: Fair (one hand off walker with reaching task)      ADL Intervention:    Declined ADLs as pt had just finished ADLs prior to therapist arrival.  Very willing to participate despite fatigue. Pain Ratin/10  left LE  Pain Intervention(s):   rest and repositioning      Activity Tolerance:   Fair   Please refer to the flowsheet for vital signs taken during this treatment.     After treatment:   Patient left in no apparent distress in bed, Call bell within reach, Bed/ chair alarm activated, and Side rails x3    COMMUNICATION/EDUCATION:   The patient's plan of care was discussed with: physical therapist, registered nurse, and patient    Patient Education  Education Given To: Patient  Education Provided: Role of Therapy;Plan of Care  Education Method: Verbal  Barriers to Learning: None  Education Outcome: Verbalized understanding    Thank you for this referral.  Simran Soni, OTR/L  Minutes: 15

## 2023-06-01 NOTE — DISCHARGE INSTRUCTIONS
Physician's or R.N.'s Signature                                                                  Date/Time                                                                                                                                              Patient or Representative Signature

## 2023-06-02 VITALS
SYSTOLIC BLOOD PRESSURE: 105 MMHG | RESPIRATION RATE: 20 BRPM | HEIGHT: 63 IN | OXYGEN SATURATION: 99 % | BODY MASS INDEX: 24.8 KG/M2 | TEMPERATURE: 98.4 F | HEART RATE: 95 BPM | WEIGHT: 140 LBS | DIASTOLIC BLOOD PRESSURE: 64 MMHG

## 2023-06-02 PROCEDURE — 6370000000 HC RX 637 (ALT 250 FOR IP): Performed by: INTERNAL MEDICINE

## 2023-06-02 PROCEDURE — 6370000000 HC RX 637 (ALT 250 FOR IP)

## 2023-06-02 RX ADMIN — SPIRONOLACTONE 12.5 MG: 25 TABLET ORAL at 08:29

## 2023-06-02 RX ADMIN — TRAMADOL HYDROCHLORIDE 50 MG: 50 TABLET ORAL at 15:21

## 2023-06-02 RX ADMIN — ASPIRIN 81 MG: 81 TABLET, CHEWABLE ORAL at 08:29

## 2023-06-02 RX ADMIN — FOLIC ACID 1 MG: 1 TABLET ORAL at 08:29

## 2023-06-02 RX ADMIN — PANTOPRAZOLE SODIUM 40 MG: 40 TABLET, DELAYED RELEASE ORAL at 05:55

## 2023-06-02 RX ADMIN — CASTOR OIL AND BALSAM, PERU: 788; 87 OINTMENT TOPICAL at 08:32

## 2023-06-02 RX ADMIN — SACUBITRIL AND VALSARTAN 1 TABLET: 49; 51 TABLET, FILM COATED ORAL at 08:29

## 2023-06-02 RX ADMIN — Medication: at 08:32

## 2023-06-02 NOTE — PROGRESS NOTES
1018 - PCP hospital follow-up transitional care appointment has been scheduled with Dr. Rosie Becerra on 6/16/23 at 1300. Pending patient discharge. Yenifer Melchor Care Management Assistant     Attempted to schedule PCP hospital follow up appointment. Unable to reach anyone, unable to leave voicemail. Phone line is busy. Pending patient discharge.  Chantel Quiles Assistant
Comprehensive Nutrition Assessment    Type and Reason for Visit:  Reassess    Nutrition Recommendations/Plan:   Continue diet as tolerated  Please document % meals and supplements consumed in flowsheet I/O's under intake   Recommend increasing bowel regimen          Nutrition Assessment:  Chart reviewed. Pt working with therapy. Discharge orders noted today, but likely discharge will be tomorrow per notes. Appetite does not appear to be very good. Pt is on scheduled glycolax, but no BM noted in several days. No recent BMP, last one was last week. Would add suppository and encourage PO intake. Patient Vitals for the past 120 hrs:   PO Meals Eaten (%)   05/30/23 1200 1 - 25%   05/30/23 0935 0%   05/29/23 1512 51 - 75%            Nutrition Related Findings:    Meds: folvite, protonix, glycolax, aldactone. Edema: trace-BLE. BM: 5/25-constipation Wound Type: None       Current Nutrition Intake & Therapies:    Average Meal Intake: 51-75%     ADULT DIET; Regular; Low Fat/Low Chol/High Fiber/2 gm Na; No red dye    Anthropometric Measures:  Height: 160 cm (5' 3\")  Ideal Body Weight (IBW): 115 lbs (52 kg)       Current Body Weight: 140 lb (63.5 kg), 121.7 % IBW.  Weight Source: Stated  Current BMI (kg/m2): 24.8                          BMI Categories: Normal Weight (BMI 22.0 to 24.9) age over 72    Estimated Daily Nutrient Needs:  Energy Requirements Based On: Formula  Weight Used for Energy Requirements: Current  Energy (kcal/day): MSJ 1500 (1150 x 1.3)  Weight Used for Protein Requirements: Current  Protein (g/day): 63-76g (1-1.2gPro/kg)  Method Used for Fluid Requirements: 1 ml/kcal  Fluid (ml/day): 1500mL    Nutrition Diagnosis:   No nutrition diagnosis at this time      Nutrition Interventions:   Food and/or Nutrient Delivery: Continue Current Diet  Nutrition Education/Counseling: No recommendation at this time  Coordination of Nutrition Care: Continue to monitor while inpatient       Goals:  Previous Goal Met:
End of Shift Note    Bedside shift change report given to Arabella Eubanks RN (oncoming nurse) by Nicole Monae LPN (offgoing nurse). Report included the following information SBAR, Kardex, Procedure Summary, Intake/Output, MAR, and Recent Results    Shift worked:  7p-730a     Shift summary and any significant changes:     Pt rested in bed through shift. Treated pain prn-see MAR. AM labs drawn. Otherwise, pt had an uneventful shift. Concerns for physician to address:       Zone phone for oncoming shift:          Activity:     Number times ambulated in hallways past shift: 0  Number of times OOB to chair past shift: 0    Cardiac:   Cardiac Monitoring: Yes           Access:   Current line(s): Peripheral IV    Genitourinary:   Urinary status: Patient is voiding without difficulty. Respiratory:      Chronic home O2 use?: NO  Incentive spirometer at bedside: NO     GI:     Current diet:  ADULT DIET; Regular; Low Fat/Low Chol/High Fiber/2 gm Na;  No red dye  Passing flatus: Yes  Tolerating current diet: Yes       Pain Management:   Patient states pain is manageable on current regimen: Yes    Skin:     Interventions:     Patient Safety:  Fall Score: PA Fall Risk Score: 70.16    Interventions:           Length of Stay:  Expected LOS: 10  Actual LOS: 14      Nicole Monae LPN
End of Shift Note    Bedside shift change report given to Dirk Huntley (oncoming nurse) by Jennifer Marks RN (offgoing nurse). Report included the following information SBAR, Kardex, Intake/Output, MAR, and Recent Results    Shift worked:  7864-3610     Shift summary and any significant changes:     Pt verbalized feeling unsafe to go home because she lives on her own. Concerns for physician to address:  Home safety after discharge.      Zone phone for oncoming shift:                 Length of Stay:  Expected LOS: 14  Actual LOS: 17      Jennifer Marks RN
End of Shift Note    Bedside shift change report given to HARRIS Buckner (oncoming nurse) by Skip Long LPN (offgoing nurse). Report included the following information SBAR, Kardex, Procedure Summary, Intake/Output, MAR, and Recent Results    Shift worked:  7p-730a     Shift summary and any significant changes:     Pt rested in bed through shift. Treated pain prn-see MAR. Foot care done. Otherwise, pt had an uneventful shift. Concerns for physician to address:       Zone phone for oncoming shift:          Activity:     Number times ambulated in hallways past shift: 0  Number of times OOB to chair past shift: 0    Cardiac:   Cardiac Monitoring: Yes           Access:   Current line(s): Peripheral IV    Genitourinary:   Urinary status: Patient is voiding without difficulty. Respiratory:      Chronic home O2 use?: NO  Incentive spirometer at bedside: NO     GI:     Current diet:  ADULT DIET; Regular; Low Fat/Low Chol/High Fiber/2 gm Na;  No red dye  Passing flatus: Yes  Tolerating current diet: Yes       Pain Management:   Patient states pain is manageable on current regimen: Yes    Skin:     Interventions:     Patient Safety:  Fall Score: PA Fall Risk Score: 88.74    Interventions:           Length of Stay:  Expected LOS: 14  Actual LOS: 15      Skip Long LPN
End of Shift Note    Bedside shift change report given to Maeve Suazo RN (oncoming nurse) by Sherman Little LPN (offgoing nurse). Report included the following information SBAR, Kardex, Procedure Summary, Intake/Output, MAR, and Recent Results    Shift worked:  7p-730a     Shift summary and any significant changes:     Pt rested in bed through shift. Treated pain prn-see MAR. Otherwise, pt had an uneventful shift. Concerns for physician to address:  C/O pain on left side of throat and would like follow up.      Zone phone for oncoming shift:   Joel Taveras LPN
End of Shift Note    Bedside shift change report given to Nona Garrett RN (oncoming nurse) by Callie Jacinto RN (offgoing nurse). Report included the following information SBAR, Kardex, Intake/Output, MAR, and Recent Results    Shift worked:  0329-5715     Shift summary and any significant changes:     Patient to discharge to [facility].      Concerns for physician to address:       Zone phone for oncoming shift:            Length of Stay:  Expected LOS: 14  Actual LOS: 16      Callie Jacinto RN
End of Shift Note    Bedside shift change report given to [employer] (oncoming nurse) by Josh Burroughs RN (offgoing nurse). Report included the following information SBAR, Kardex, and MAR    Shift worked:  3426-9274     Shift summary and any significant changes:     Pt did bird bath today. Pt turned 2q, heels floated     Concerns for physician to address:  Rash at groin area and outside vagina,, pt c/o of itching and burning. Can we get something for it? Zone phone for oncoming shift:   [phone]       Activity:     Number times ambulated in hallways past shift: 0  Number of times OOB to chair past shift: 0    Cardiac:   Cardiac Monitoring: Yes           Access:  Current line(s): PIV     Genitourinary:   Urinary status: voiding    Respiratory:      Chronic home O2 use?: NO  Incentive spirometer at bedside: YES       GI:     Current diet:  ADULT DIET; Regular; Low Fat/Low Chol/High Fiber/2 gm Na;  No red dye  Passing flatus: YES  Tolerating current diet: YES       Pain Management:   Patient states pain is manageable on current regimen: YES    Skin:     Interventions: float heels and increase time out of bed    Patient Safety:  Fall Score:    Interventions: gripper socks, pt to call before getting OOB, and stay with me (per policy)       Length of Stay:  Expected LOS: 10  Actual LOS: [facility], RN
End of Shift Note    Bedside shift change report given to Quoc Turner (oncoming nurse) by Miracle Triana RN (offgoing nurse). Report included the following information SBAR, Kardex, and MAR    Shift worked:  5927-4940     Shift summary and any significant changes:    -pt nervous about discharging  -prn tylenol given, using throat spray and ice pack     Concerns for physician to address:      Zone phone for oncoming shift:   [phone]       Activity:     Number times ambulated in hallways past shift: 0  Number of times OOB to chair past shift: 0    Cardiac:   Cardiac Monitoring: Yes           Access:  Current line(s): PIV     Genitourinary:   Urinary status: voiding    Respiratory:      Chronic home O2 use?: NO  Incentive spirometer at bedside: YES       GI:     Current diet:  ADULT DIET; Regular; Low Fat/Low Chol/High Fiber/2 gm Na;  No red dye  Passing flatus: YES  Tolerating current diet: YES       Pain Management:   Patient states pain is manageable on current regimen: YES    Skin:     Interventions: float heels and increase time out of bed    Patient Safety:  Fall Score:    Interventions: gripper socks, pt to call before getting OOB, and stay with me (per policy)       Length of Stay:  Expected LOS: 14  Actual LOS: 14      Miracle Triana, RN
[facility] here to  patient. Patient sent home with DC paperwork, meds and belongings.  Enrique Palma RN
Hospitalist Progress Note    NAME:   Terry Sotelo   : 1960   MRN: [id number]     Date/Time: 2023 2:44 PM  Patient PCP: Jordyn Bauer MD    Estimated discharge date: stable, awaiting for rehab      Assessment / Plan:    Left leg pain with difficulty ambulating  Left baker's cyst  -dopplers no DVT but shows baker's cyst  -ROSEMARY     Right side findings: Resting ROSEMARY is 1. 11. This is within the normal range. Right TBI was not obtained due to severely decreased PPG signal.    Left side findings: Resting ROSEMARY is 1.01. This is within the normal range. Moderately decreased resting left TBI. -tender to touch and warm, hard to appreciate erythema so will empirically treat for cellulitis  -CT LLE:IMPRESSION:  1. Diffuse soft tissue edema. No drainable fluid collection. 2.  Large left knee joint effusion with synovitis and small left popliteal cyst  with nodular capsular thickening, cannot exclude infection based on imaging alone. -ESR elevated 108, procal wnl  -ortho evaluated, no plan to aspirate  -PT OT eval and treat, recommended rehab however pt declined and wants to go home.    -continue short course prn tramadol. Multifactorial anemia, anemia of chronic disease  Gastritis  No active signs of bleeding. Repeat CT L leg to r/o hematoma showed no hematoma . Hgb 6.7 on  , s/p 1 unit prbc  , repeat hb 7.1  Repeat hemoglobin on  showed 5.1 hemoglobin, she was given 1 unit of PRBC, recheck hemoglobin was 9.4. So likely 5.1 was lab error  -CT abdomen was done which was negative for significant bleeding source  -LDH and haptoglobin okay  -Patient had colonoscopy on , multiple polyps, biopsies are taken. Recommends repeat colonoscopy in 1 year. -EGD on  showed diffuse congestion, erythema, mild friability without gross ulceration. Multiple clean-based duoedenal ulcers.   No active bleeding  -Recommend PPI p.o. twice daily for 8 weeks then daily  -Recommend to hold warfarin for 1
Hospitalist Progress Note    NAME: Balbina Veloz   : 1960   MRN: [id number]     Date/Time: 2023 10:47 AM  Patient PCP: Yani Viveros MD    Estimated discharge date:  Stable for DC pending placement  Barriers :       Assessment / Plan:         Left leg pain with difficulty ambulating  Left baker's cyst  -dopplers no DVT but shows baker's cyst  -ROSEMARY     Right side findings: Resting ROSEMARY is 1. 11. This is within the normal range. Right TBI was not obtained due to severely decreased PPG signal.    Left side findings: Resting ROSEMAYR is 1.01. This is within the normal range. Moderately decreased resting left TBI. -tender to touch and warm, hard to appreciate erythema so will empirically treat for cellulitis  -CT LLE:IMPRESSION:  1. Diffuse soft tissue edema. No drainable fluid collection. 2.  Large left knee joint effusion with synovitis and small left popliteal cyst  with nodular capsular thickening, cannot exclude infection based on imaging alone. -ESR elevated 108, procal wnl  -ortho following, no plan to aspirate  -PT OT eval and treat, will need rehab. Pt lives alone and is currently unable to care for herself given current condition        Multifactorial anemia, anemia of chronic disease  Gastritis  No active signs of bleeding. repeat CT L leg to r/o hematoma showed no hematoma . Hgb 6.7 on  , s/p 1 unit prbc  , repeat hb 7.1     Repeat hemoglobin on  showed 5.1 hemoglobin, she was given 1 unit of PRBC, recheck hemoglobin was 9.4. So likely 5.1 was lab error  -CT abdomen was done which was negative for significant bleeding source  -LDH and haptoglobin okay     -Patient had colonoscopy on , multiple polyps, biopsies are taken. Recommends repeat colonoscopy in 1 year. -EGD on  showed diffuse congestion, erythema, mild friability without gross ulceration. Multiple clean-based duoedenal ulcers.   No active bleeding     -Recommend PPI p.o. twice daily for 8 weeks then daily
Notified by RN that pt had a fall earlier. I spoke to the patient. She is AAOx4. States she hit her head. Vitals taken after the fall reviewed. Will obtain head CT since she hit her head. Discussed with pt safety issues at home. PT was recommended IPR and pt wanted to go home. She changed her mind now and would like to pursue IPR. Notified CM and nursing staffs.
Patient had an unwitnessed fall. Stated that she hit her head, post fall assessment done. Patient A/O X4.
Transition of Care Plan:     RUR: 11%  Prior Level of Functioning:   Disposition: Home w/[facility]-PT,OT&SN _ SOC is Monday 6/5  If SNF or IPR: Date FOC offered:   Date FOC received:   Accepting facility:   Date authorization started with reference number:   Date authorization received and expires: Follow up appointments: on AVS  DME needed: r/w-[place]  Transportation at discharge: Kelly Lim ([phone]-[facility]# [id number]) @10am 6/2-ride will call unit when they arrive  IM/IMM Medicare/ letter given: n/a  Is patient a  and connected with VA? If yes, was [employer] transfer form completed and VA notified? Caregiver Contact:   Discharge Caregiver contacted prior to discharge? pt will contact  Care Conference needed? Barriers to discharge:     4:45  Due to time of day & lack of assistance for pt at home, pt will d/c tomorrow @10am    4:15  CM offered LTC & pt declined. Patient is going to see if a girlfriend can meet her at home this evening. CM sent referrals to [facility], [facility], [facility] & [facility]. None of the IPR's can accept. Patient is either to high functioning or does not have a CMS IPR diagnosis. Patient will need to go home w/HH. CM will notify pt and arrange for Medicaid transportation.     Alee Douglas  Ext [phone]
Transition of Care Plan:     RUR: 13%  Prior Level of Functioning:   Disposition: Home w/[facility]-PT,OT&SN _ SOC is Monday 6/5  If SNF or IPR: Date FOC offered:   Date FOC received:   Accepting facility:   Date authorization started with reference number:   Date authorization received and expires: Follow up appointments: on AVS  DME needed: r/w-[place]  Transportation at discharge: [email or web address]  IM/IMM Medicare/ letter given: n/a  Is patient a [place] and connected with VA? If yes, was [employer] transfer form completed and VA notified? Caregiver Contact:   Discharge Caregiver contacted prior to discharge? pt will contact  Care Conference needed? Barriers to discharge:     4:53  The nursing supervisor gave RN Harper Never to use [facility]. CM set up transportation. 4:31  Medicaid was unable to schedule an earlier trip. ETA is 11:45pm.    3:00  CM called for ETA & was informed that [facility] picked up the trip and pushed it back till 7pm.  CM requested Medicaid find a different provider that was able to come earlier. CM will f/u prior to leaving for the day. Patient is d/c home today w/HH & a r/w. Patient will be transported via stretcher via Medicaid. [address] [employer] ([phone]-[facility]# [id number]).   ETA 3pm    Kuldeep South African  Ext [phone]
Transition of Care Plan:     RUR: 9%  Prior Level of Functioning:   Disposition: Home w/HH   If SNF or IPR: Date FOC offered:   Date FOC received:   Accepting facility:   Date authorization started with reference number:   Date authorization received and expires: Follow up appointments:   DME needed: rollator & BSC  Transportation at discharge: Medicaid vs friend  IM/IMM Medicare/ letter given:   Is patient a [place] and connected with VA? If yes, was [employer] transfer form completed and VA notified? Caregiver Contact:   Discharge Caregiver contacted prior to discharge? Care Conference needed? Barriers to discharge:     [facility] declined. Patient wants to go home w/HH. CM will send referrals for [place] & DME. CM informed pt she will be ready for d/c tomorrow morning. Patient stated she will call her friend. CM will continue to follow.     Zak Millan  Ext [phone]
Transition of Care Plan:     RUR: 9%  Prior Level of Functioning:   Disposition: Home w/[facility]-PT,OT&SN   If SNF or IPR: Date FOC offered:   Date FOC received:   Accepting facility:   Date authorization started with reference number:   Date authorization received and expires: Follow up appointments: on AVS  DME needed: r/w-[place]  Transportation at discharge: Medicaid   IM/IMM Medicare/ letter given: n/a  Is patient a [place] and connected with VA? If yes, was [employer] transfer form completed and VA notified? Caregiver Contact:   Discharge Caregiver contacted prior to discharge? pt will contact  Care Conference needed? Barriers to discharge:     3;46  After falling in room, pt has decided to go to IPR at d/c.  CM sent multiple referral via [employer]. Medicaid transport has been canceled. 1:57  Medicaid transport is scheduled to transport pt between 2pm & 4:40. They will call the unit when they arrive & will provide door to door service. CM delivered r/w to pt. DME provider informed CM, that pt does not qualify for a BSC. CM is d/c home today w/HH & a r/w. No other needs identified.     [facility]  Ext [phone]
Transition of Care Plan:    RUR: 14%  Prior Level of Functioning:   Disposition: IPR  If SNF or IPR: Date FOC offered:   Date FOC received:   Accepting facility: Referral sent to [facility]. Date authorization started with reference number:   Date authorization received and expires: Follow up appointments:   DME needed:   Transportation at discharge: Medicaid  IM/IMM Medicare/ letter given:   Is patient a  and connected with VA? If yes, was [employer] transfer form completed and VA notified? Caregiver Contact:   Discharge Caregiver contacted prior to discharge? Care Conference needed? Barriers to discharge:     OT eval uploaded for IPR consideration. Will not hear back until Tuesday.
RESUME on June 5,2023     CAD, hx stents  Hx of LV thrombus in 2021 at [facility], on coumadin for this  -continue entresto and statin  -Add aspirin for CAD  -Repeat echocardiogram was done today, showed no LV thrombus. EF 40 to 45%  -Seen by cardiologist, okay to hold warfarin for 1 week, should resume on 6/5. Medical Decision Making:   I personally reviewed labs  I personally reviewed imaging  Toxic drug monitoring monitor renal fxn while on aldactone and entresto        Code Status: full  DVT Prophylaxis: scd  GI Prophylaxis:ppi    Subjective:     Chief Complaint / Reason for Physician Visit  Awake, no new complaint. Discussed with RN events overnight. Objective:     VITALS:   Last 24hrs VS reviewed since prior progress note. Most recent are:  Patient Vitals for the past 24 hrs:   BP Temp Temp src Pulse Resp SpO2   06/01/23 0801 (!) 165/86 97.2 °F (36.2 °C) Oral 58 18 100 %   06/01/23 0538 -- -- -- -- 18 --   06/01/23 0340 (!) 168/75 97.9 °F (36.6 °C) -- 53 17 96 %   05/31/23 1954 (!) 165/118 97.9 °F (36.6 °C) -- 56 17 98 %   05/31/23 1516 (!) 146/102 98.2 °F (36.8 °C) -- (!) 102 -- 100 %         Intake/Output Summary (Last 24 hours) at 6/1/2023 1442  Last data filed at 6/1/2023 0725  Gross per 24 hour   Intake 200 ml   Output 0 ml   Net 200 ml        I had a face to face encounter and independently examined this patient on 6/1/2023, as outlined below:  PHYSICAL EXAM:  General: Alert, cooperative   EENT:  EOMI. Anicteric sclerae. MMM  Resp:  CTA bilaterally, no wheezing or rales. No accessory muscle use  CV:  Regular  rhythm,  No edema  GI:  Soft, Non distended, Non tender. +Bowel sounds  Neurologic:  Alert and oriented X 3, normal speech,   Psych:   Good insight. Not anxious nor agitated  Skin:  No rashes.   No jaundice    Reviewed most current lab test results and cultures  YES  Reviewed most current radiology test results   YES  Review and summation of old records today    NO  Reviewed

## 2023-08-22 ENCOUNTER — TELEPHONE (OUTPATIENT)
Age: 63
End: 2023-08-22

## 2023-08-22 NOTE — TELEPHONE ENCOUNTER
----- Message from Shahram Wills sent at 8/21/2023 11:37 AM EDT -----  Subject: Hospital Follow Up    QUESTIONS  What [facility] the [facility]  Date of Discharge? 2023-08-30  Discharge Location? Home  Reason for hospitalization as patient stated? Progressive weakness  What question does the patient have, if applicable? Patient needs a   hospital follow up visit. I am not able to reach the . Please   call michael Meneses mgr.   ---------------------------------------------------------------------------  --------------  CALL BACK INFO  What is the best way for the office to contact you? OK to leave message on   voicemail  Preferred Call Back Phone Number? [phone]  ---------------------------------------------------------------------------  --------------  SCRIPT ANSWERS  Relationship to Patient? Covered Entity  Covered Entity Type? Hospital?  Representative Name?  Radha Carcamo

## 2023-08-24 ENCOUNTER — TELEPHONE (OUTPATIENT)
Age: 63
End: 2023-08-24

## 2023-08-24 NOTE — TELEPHONE ENCOUNTER
----- Message from Olya Rafa sent at 8/22/2023  2:04 PM EDT -----  Subject: Hospital Follow Up    QUESTIONS  What hospital was the Patient Discharged from? [facility]  Date of Discharge? 2023-08-30  Discharge Location? Home  Reason for hospitalization as patient stated? PROGRESSIVE WEAKNESS AND IN   HOSPITAL MULTIPLE TIMES- VOICEMAIL IS CONFIDENTIAL AND DATE AND TIME OF   APPOINTMENT CAN BE LEFT  [facility]  What question does the patient have, if applicable?   ---------------------------------------------------------------------------  --------------  CALL BACK INFO  What is the best way for the office to contact you? OK to leave message on   voicemail  Preferred Call Back Phone Number? [phone]  ---------------------------------------------------------------------------  --------------  SCRIPT ANSWERS  Relationship to Patient? Covered Entity  Covered Entity Type? Hospital?  Representative Name?  Oneil Escobar

## 2023-09-15 ENCOUNTER — OFFICE VISIT (OUTPATIENT)
Age: 63
End: 2023-09-15
Payer: COMMERCIAL

## 2023-09-15 VITALS
WEIGHT: 130 LBS | OXYGEN SATURATION: 98 % | BODY MASS INDEX: 23.03 KG/M2 | TEMPERATURE: 98.6 F | SYSTOLIC BLOOD PRESSURE: 124 MMHG | DIASTOLIC BLOOD PRESSURE: 85 MMHG | HEART RATE: 85 BPM

## 2023-09-15 DIAGNOSIS — Z87.11 HISTORY OF STOMACH ULCERS: ICD-10-CM

## 2023-09-15 DIAGNOSIS — I23.6 THROMBOSIS OF ATRIUM, AURICULAR APPENDAGE, AND VENTRICLE AS CURRENT COMPLICATIONS FOLLOWING ACUTE MYOCARDIAL INFARCTION (CODE) (HCC): Primary | ICD-10-CM

## 2023-09-15 DIAGNOSIS — I25.10 ATHEROSCLEROSIS OF NATIVE CORONARY ARTERY OF NATIVE HEART WITHOUT ANGINA PECTORIS: ICD-10-CM

## 2023-09-15 DIAGNOSIS — K63.5 POLYP OF COLON, UNSPECIFIED PART OF COLON, UNSPECIFIED TYPE: ICD-10-CM

## 2023-09-15 PROCEDURE — 99214 OFFICE O/P EST MOD 30 MIN: CPT | Performed by: FAMILY MEDICINE

## 2023-09-15 RX ORDER — TRAMADOL HYDROCHLORIDE 50 MG/1
50 TABLET ORAL EVERY 6 HOURS PRN
COMMUNITY
Start: 2023-08-30 | End: 2023-09-15

## 2023-09-15 RX ORDER — ROSUVASTATIN CALCIUM 20 MG/1
20 TABLET, COATED ORAL DAILY
Qty: 90 TABLET | Refills: 3 | Status: SHIPPED | OUTPATIENT
Start: 2023-09-15

## 2023-09-15 RX ORDER — APIXABAN 5 MG/1
5 TABLET, FILM COATED ORAL 2 TIMES DAILY
COMMUNITY
Start: 2023-09-02

## 2023-09-15 RX ORDER — PANTOPRAZOLE SODIUM 40 MG/1
40 TABLET, DELAYED RELEASE ORAL
Qty: 90 TABLET | Refills: 3 | Status: SHIPPED | OUTPATIENT
Start: 2023-09-15

## 2023-09-15 SDOH — ECONOMIC STABILITY: FOOD INSECURITY: WITHIN THE PAST 12 MONTHS, YOU WORRIED THAT YOUR FOOD WOULD RUN OUT BEFORE YOU GOT MONEY TO BUY MORE.: NEVER TRUE

## 2023-09-15 SDOH — ECONOMIC STABILITY: FOOD INSECURITY: WITHIN THE PAST 12 MONTHS, THE FOOD YOU BOUGHT JUST DIDN'T LAST AND YOU DIDN'T HAVE MONEY TO GET MORE.: NEVER TRUE

## 2023-09-15 SDOH — ECONOMIC STABILITY: HOUSING INSECURITY
IN THE LAST 12 MONTHS, WAS THERE A TIME WHEN YOU DID NOT HAVE A STEADY PLACE TO SLEEP OR SLEPT IN A SHELTER (INCLUDING NOW)?: NO

## 2023-09-15 SDOH — ECONOMIC STABILITY: INCOME INSECURITY: HOW HARD IS IT FOR YOU TO PAY FOR THE VERY BASICS LIKE FOOD, HOUSING, MEDICAL CARE, AND HEATING?: NOT HARD AT ALL

## 2023-09-15 ASSESSMENT — PATIENT HEALTH QUESTIONNAIRE - PHQ9
1. LITTLE INTEREST OR PLEASURE IN DOING THINGS: 0
SUM OF ALL RESPONSES TO PHQ QUESTIONS 1-9: 0
SUM OF ALL RESPONSES TO PHQ9 QUESTIONS 1 & 2: 0
2. FEELING DOWN, DEPRESSED OR HOPELESS: 0
SUM OF ALL RESPONSES TO PHQ QUESTIONS 1-9: 0

## 2023-09-15 NOTE — PROGRESS NOTES
Chief Complaint   Patient presents with    Follow-Up from 2025 [facility] Maintenance Due   Topic Date Due    COVID-19 Vaccine (1) Never done    Pneumococcal 65+ years Vaccine (1 - PCV) Never done    HIV screen  Never done    Hepatitis C screen  Never done    DTaP/Tdap/Td vaccine (1 - Tdap) Never done    Cervical cancer screen  Never done    Breast cancer screen  Never done    Shingles vaccine (1 of 2) Never done    DEXA (modify frequency per FRAX score)  Never done    Lipids  07/15/2023    Depression Screen  07/15/2023    Flu vaccine (1) Never done           1. \"Have you been to the ER, urgent care clinic since your last visit? Hospitalized since your last visit? \"  Yes [facility]    2. \"Have you seen or consulted any other health care providers outside of the [address] [place] [address] since your last visit? \" No     3. For patients aged 43-73: Has the patient had a colonoscopy / FIT/ Cologuard? Yes - no Care Gap present      If the patient is female:    4. For patients aged 43-66: Has the patient had a mammogram within the past 2 years? No      5. For patients aged 21-65: Has the patient had a pap smear?  No

## 2023-09-15 NOTE — PROGRESS NOTES
QUOC Rollins is a 70 y.o. female who Presents to follow-up hospital stay [facility] proper 8/12 - 8/17. And subsequent stay SNF. Discharge 8/30    Presented with right lower extremity DVT. Found to have an acute right popliteal DVT. Reported gradual worsening of pain for weeks and swelling in the right lower extremity. Started on apixaban. Arterial duplex and ROSEMARY without significant stenosis. Given tramadol for severe pain. Regarding her history of LV thrombus following STEMI. She had been taking warfarin until an episode of acute blood loss anemia in May 2023. TTE at that time noted no LV thrombus and her anticoagulants stopped. TTE at [facility] August 2023 noted a large LV thrombus     CHF  Spironolactone  Entresto  No beta-blocker due to bradycardia  No diuretic at discharge  Recommended cardiology follow-up    She has been receiving home health since she got home. Physical therapy occupational therapy and an aide. She is enthusiastic about working on her strength and mobility. She is using a walker today. She stopped taking Protonix because she read the a rare side effect of SLE and that scared her.   She was taking this medication because of the GI bleed back in May    She stopped taking atorvastatin because the pill was too big to swallow    PMHx:  Past Medical History:   Diagnosis Date    Hypertension     Psychiatric disorder     panic, anxiety       Meds:   Current Outpatient Medications   Medication Sig Dispense Refill    ELIQUIS 5 MG TABS tablet Take 1 tablet by mouth 2 times daily      rosuvastatin (CRESTOR) 20 MG tablet Take 1 tablet by mouth daily 90 tablet 3    pantoprazole (PROTONIX) 40 MG tablet Take 1 tablet by mouth every morning (before breakfast) 90 tablet 3    spironolactone (ALDACTONE) 25 MG tablet Take 0.5 tablets by mouth daily (Patient taking differently: Take 0.5 tablets by mouth daily 1 tablet by mouth daily) 30 tablet 0    nitroGLYCERIN (NITROSTAT) 0.4 MG SL tablet

## 2023-10-05 ENCOUNTER — TELEPHONE (OUTPATIENT)
Age: 63
End: 2023-10-05

## 2023-10-05 DIAGNOSIS — I51.3 LV (LEFT VENTRICULAR) MURAL THROMBUS: ICD-10-CM

## 2023-10-05 DIAGNOSIS — I82.409 RECURRENT DEEP VEIN THROMBOSIS (DVT) (HCC): Primary | ICD-10-CM

## 2023-10-05 RX ORDER — SPIRONOLACTONE 25 MG/1
25 TABLET ORAL DAILY
Qty: 90 TABLET | Refills: 3 | Status: SHIPPED | OUTPATIENT
Start: 2023-10-05

## 2023-10-05 NOTE — TELEPHONE ENCOUNTER
Refill  Request: Pt calling   -Pt is out of medication   -please call pt back when RX has been sent in   [facility] CUELLAR 5 MG TABS tablet       [address], [address] [place] [address] [place] [address]   Phone:  [phone]  Fax:  [phone]    Thank You

## 2024-01-23 RX ORDER — SACUBITRIL AND VALSARTAN 49; 51 MG/1; MG/1
1 TABLET, FILM COATED ORAL 2 TIMES DAILY
Qty: 180 TABLET | Refills: 3 | Status: SHIPPED | OUTPATIENT
Start: 2024-01-23

## 2024-01-23 RX ORDER — PANTOPRAZOLE SODIUM 40 MG/1
40 TABLET, DELAYED RELEASE ORAL
Qty: 90 TABLET | Refills: 3 | Status: SHIPPED | OUTPATIENT
Start: 2024-01-23

## 2024-01-23 RX ORDER — SPIRONOLACTONE 25 MG/1
25 TABLET ORAL DAILY
Qty: 90 TABLET | Refills: 3 | Status: SHIPPED | OUTPATIENT
Start: 2024-01-23

## 2024-01-29 ENCOUNTER — TELEPHONE (OUTPATIENT)
Age: 64
End: 2024-01-29

## 2024-03-06 RX ORDER — SPIRONOLACTONE 25 MG/1
25 TABLET ORAL DAILY
Qty: 90 TABLET | Refills: 3 | Status: SHIPPED | OUTPATIENT
Start: 2024-03-06

## 2024-03-06 RX ORDER — SACUBITRIL AND VALSARTAN 49; 51 MG/1; MG/1
1 TABLET, FILM COATED ORAL 2 TIMES DAILY
Qty: 180 TABLET | Refills: 3 | Status: SHIPPED | OUTPATIENT
Start: 2024-03-06

## 2024-03-06 RX ORDER — PANTOPRAZOLE SODIUM 40 MG/1
40 TABLET, DELAYED RELEASE ORAL
Qty: 90 TABLET | Refills: 3 | Status: SHIPPED | OUTPATIENT
Start: 2024-03-06

## 2024-03-06 NOTE — TELEPHONE ENCOUNTER
Refill request (pt calling)    sacubitril-valsartan (ENTRESTO) 49-51 MG per tablet     pantoprazole (PROTONIX) 40 MG tablet      spironolactone (ALDACTONE) 25 MG tablet     [facility] in [place]

## 2024-04-16 RX ORDER — PANTOPRAZOLE SODIUM 40 MG/1
40 TABLET, DELAYED RELEASE ORAL
Qty: 90 TABLET | Refills: 3 | Status: SHIPPED | OUTPATIENT
Start: 2024-04-16

## 2024-04-16 NOTE — TELEPHONE ENCOUNTER
Patient is calling in for med refill. Patient only has 2 pills left.             pantoprazole (PROTONIX) 40 MG tablet

## 2024-08-22 ENCOUNTER — COMMUNITY OUTREACH (OUTPATIENT)
Age: 64
End: 2024-08-22

## (undated) DEVICE — SINGLE-USE BIOPSY FORCEPS: Brand: RADIAL JAW 4

## (undated) DEVICE — CLIP LIG L235CM RESOL 360 BX/20

## (undated) DEVICE — CATHETER IV 24GA L0.75IN OD0.6604-0.7366MM

## (undated) DEVICE — IV START KIT: Brand: MEDLINE

## (undated) DEVICE — CUFF BLD PRSS AD CLTH SGL TB W/ BAYNT CONN ROUNDED CORNER

## (undated) DEVICE — ENDOSCOPIC KIT COMPLIANCE ENDOKIT

## (undated) DEVICE — CONTAINER SPEC 20 ML LID NEUT BUFF FORMALIN 10 % POLYPR STS

## (undated) DEVICE — TRAP ENDOSCP POLYP 2 CHMBR DRAWER TYP

## (undated) DEVICE — CATHETER IV 20GA L1.16IN OD1.0414-1.1176MM ID0.762-0.8382MM

## (undated) DEVICE — CATHETER IV 22GA L1IN OD0.8382-0.9144MM ID0.6096-0.6858MM

## (undated) DEVICE — CATHETER IV 18GA L1.16IN OD1.27-1.3462MM ID0.9398-1.016MM

## (undated) DEVICE — SYSTEM REPROC CBL 3 LD DISPOSABLE